# Patient Record
Sex: MALE | Race: BLACK OR AFRICAN AMERICAN | NOT HISPANIC OR LATINO | URBAN - METROPOLITAN AREA
[De-identification: names, ages, dates, MRNs, and addresses within clinical notes are randomized per-mention and may not be internally consistent; named-entity substitution may affect disease eponyms.]

---

## 2018-01-06 ENCOUNTER — INPATIENT (INPATIENT)
Facility: HOSPITAL | Age: 58
LOS: 1 days | Discharge: HOME | End: 2018-01-08
Attending: HOSPITALIST | Admitting: HOSPITALIST
Payer: COMMERCIAL

## 2018-01-06 VITALS
TEMPERATURE: 100 F | OXYGEN SATURATION: 96 % | HEART RATE: 122 BPM | SYSTOLIC BLOOD PRESSURE: 158 MMHG | RESPIRATION RATE: 24 BRPM | DIASTOLIC BLOOD PRESSURE: 101 MMHG

## 2018-01-06 DIAGNOSIS — Z29.9 ENCOUNTER FOR PROPHYLACTIC MEASURES, UNSPECIFIED: ICD-10-CM

## 2018-01-06 DIAGNOSIS — A41.9 SEPSIS, UNSPECIFIED ORGANISM: ICD-10-CM

## 2018-01-06 DIAGNOSIS — J11.1 INFLUENZA DUE TO UNIDENTIFIED INFLUENZA VIRUS WITH OTHER RESPIRATORY MANIFESTATIONS: ICD-10-CM

## 2018-01-06 DIAGNOSIS — J45.901 UNSPECIFIED ASTHMA WITH (ACUTE) EXACERBATION: ICD-10-CM

## 2018-01-06 DIAGNOSIS — J45.909 UNSPECIFIED ASTHMA, UNCOMPLICATED: ICD-10-CM

## 2018-01-06 LAB
ALBUMIN SERPL ELPH-MCNC: 4.9 G/DL — SIGNIFICANT CHANGE UP (ref 3.3–5)
ALP SERPL-CCNC: 69 U/L — SIGNIFICANT CHANGE UP (ref 40–120)
ALT FLD-CCNC: 20 U/L — SIGNIFICANT CHANGE UP (ref 4–41)
APTT BLD: 31.7 SEC — SIGNIFICANT CHANGE UP (ref 27.5–37.4)
AST SERPL-CCNC: 45 U/L — HIGH (ref 4–40)
B PERT DNA SPEC QL NAA+PROBE: SIGNIFICANT CHANGE UP
BASE EXCESS BLDV CALC-SCNC: 3.1 MMOL/L — SIGNIFICANT CHANGE UP
BASOPHILS # BLD AUTO: 0.05 K/UL — SIGNIFICANT CHANGE UP (ref 0–0.2)
BASOPHILS NFR BLD AUTO: 0.7 % — SIGNIFICANT CHANGE UP (ref 0–2)
BILIRUB SERPL-MCNC: 0.7 MG/DL — SIGNIFICANT CHANGE UP (ref 0.2–1.2)
BLOOD GAS VENOUS - CREATININE: SIGNIFICANT CHANGE UP MG/DL (ref 0.5–1.3)
BUN SERPL-MCNC: 17 MG/DL — SIGNIFICANT CHANGE UP (ref 7–23)
C PNEUM DNA SPEC QL NAA+PROBE: NOT DETECTED — SIGNIFICANT CHANGE UP
CALCIUM SERPL-MCNC: 9.6 MG/DL — SIGNIFICANT CHANGE UP (ref 8.4–10.5)
CHLORIDE BLDV-SCNC: 103 MMOL/L — SIGNIFICANT CHANGE UP (ref 96–108)
CHLORIDE SERPL-SCNC: 98 MMOL/L — SIGNIFICANT CHANGE UP (ref 98–107)
CK MB BLD-MCNC: 0.5 — SIGNIFICANT CHANGE UP (ref 0–2.5)
CK MB BLD-MCNC: 5.2 NG/ML — SIGNIFICANT CHANGE UP (ref 1–6.6)
CK SERPL-CCNC: 1048 U/L — HIGH (ref 30–200)
CO2 SERPL-SCNC: 25 MMOL/L — SIGNIFICANT CHANGE UP (ref 22–31)
CREAT SERPL-MCNC: 1.28 MG/DL — SIGNIFICANT CHANGE UP (ref 0.5–1.3)
EOSINOPHIL # BLD AUTO: 0 K/UL — SIGNIFICANT CHANGE UP (ref 0–0.5)
EOSINOPHIL NFR BLD AUTO: 0 % — SIGNIFICANT CHANGE UP (ref 0–6)
FLUAV H1 2009 PAND RNA SPEC QL NAA+PROBE: POSITIVE — HIGH
FLUAV H1 RNA SPEC QL NAA+PROBE: NOT DETECTED — SIGNIFICANT CHANGE UP
FLUAV H3 RNA SPEC QL NAA+PROBE: NOT DETECTED — SIGNIFICANT CHANGE UP
FLUBV RNA SPEC QL NAA+PROBE: NOT DETECTED — SIGNIFICANT CHANGE UP
GAS PNL BLDV: 138 MMOL/L — SIGNIFICANT CHANGE UP (ref 136–146)
GLUCOSE BLDV-MCNC: 110 — HIGH (ref 70–99)
GLUCOSE SERPL-MCNC: 106 MG/DL — HIGH (ref 70–99)
HADV DNA SPEC QL NAA+PROBE: NOT DETECTED — SIGNIFICANT CHANGE UP
HCO3 BLDV-SCNC: 25 MMOL/L — SIGNIFICANT CHANGE UP (ref 20–27)
HCOV 229E RNA SPEC QL NAA+PROBE: NOT DETECTED — SIGNIFICANT CHANGE UP
HCOV HKU1 RNA SPEC QL NAA+PROBE: NOT DETECTED — SIGNIFICANT CHANGE UP
HCOV NL63 RNA SPEC QL NAA+PROBE: NOT DETECTED — SIGNIFICANT CHANGE UP
HCOV OC43 RNA SPEC QL NAA+PROBE: NOT DETECTED — SIGNIFICANT CHANGE UP
HCT VFR BLD CALC: 45.8 % — SIGNIFICANT CHANGE UP (ref 39–50)
HCT VFR BLDV CALC: 47.3 % — SIGNIFICANT CHANGE UP (ref 39–51)
HGB BLD-MCNC: 15.4 G/DL — SIGNIFICANT CHANGE UP (ref 13–17)
HGB BLDV-MCNC: 15.4 G/DL — SIGNIFICANT CHANGE UP (ref 13–17)
HMPV RNA SPEC QL NAA+PROBE: NOT DETECTED — SIGNIFICANT CHANGE UP
HPIV1 RNA SPEC QL NAA+PROBE: NOT DETECTED — SIGNIFICANT CHANGE UP
HPIV2 RNA SPEC QL NAA+PROBE: NOT DETECTED — SIGNIFICANT CHANGE UP
HPIV3 RNA SPEC QL NAA+PROBE: NOT DETECTED — SIGNIFICANT CHANGE UP
HPIV4 RNA SPEC QL NAA+PROBE: NOT DETECTED — SIGNIFICANT CHANGE UP
IMM GRANULOCYTES # BLD AUTO: 0.02 # — SIGNIFICANT CHANGE UP
IMM GRANULOCYTES NFR BLD AUTO: 0.3 % — SIGNIFICANT CHANGE UP (ref 0–1.5)
INR BLD: 1.18 — HIGH (ref 0.88–1.17)
LACTATE BLDV-MCNC: 1.7 MMOL/L — SIGNIFICANT CHANGE UP (ref 0.5–2)
LYMPHOCYTES # BLD AUTO: 0.79 K/UL — LOW (ref 1–3.3)
LYMPHOCYTES # BLD AUTO: 11.6 % — LOW (ref 13–44)
M PNEUMO DNA SPEC QL NAA+PROBE: NOT DETECTED — SIGNIFICANT CHANGE UP
MCHC RBC-ENTMCNC: 30.8 PG — SIGNIFICANT CHANGE UP (ref 27–34)
MCHC RBC-ENTMCNC: 33.6 % — SIGNIFICANT CHANGE UP (ref 32–36)
MCV RBC AUTO: 91.6 FL — SIGNIFICANT CHANGE UP (ref 80–100)
MONOCYTES # BLD AUTO: 0.74 K/UL — SIGNIFICANT CHANGE UP (ref 0–0.9)
MONOCYTES NFR BLD AUTO: 10.9 % — SIGNIFICANT CHANGE UP (ref 2–14)
NEUTROPHILS # BLD AUTO: 5.19 K/UL — SIGNIFICANT CHANGE UP (ref 1.8–7.4)
NEUTROPHILS NFR BLD AUTO: 76.5 % — SIGNIFICANT CHANGE UP (ref 43–77)
NRBC # FLD: 0 — SIGNIFICANT CHANGE UP
PCO2 BLDV: 49 MMHG — SIGNIFICANT CHANGE UP (ref 41–51)
PH BLDV: 7.37 PH — SIGNIFICANT CHANGE UP (ref 7.32–7.43)
PLATELET # BLD AUTO: 211 K/UL — SIGNIFICANT CHANGE UP (ref 150–400)
PMV BLD: 9.8 FL — SIGNIFICANT CHANGE UP (ref 7–13)
PO2 BLDV: 30 MMHG — LOW (ref 35–40)
POTASSIUM BLDV-SCNC: 3.9 MMOL/L — SIGNIFICANT CHANGE UP (ref 3.4–4.5)
POTASSIUM SERPL-MCNC: 3.9 MMOL/L — SIGNIFICANT CHANGE UP (ref 3.5–5.3)
POTASSIUM SERPL-SCNC: 3.9 MMOL/L — SIGNIFICANT CHANGE UP (ref 3.5–5.3)
PROT SERPL-MCNC: 8 G/DL — SIGNIFICANT CHANGE UP (ref 6–8.3)
PROTHROM AB SERPL-ACNC: 13.1 SEC — SIGNIFICANT CHANGE UP (ref 9.8–13.1)
RBC # BLD: 5 M/UL — SIGNIFICANT CHANGE UP (ref 4.2–5.8)
RBC # FLD: 13.7 % — SIGNIFICANT CHANGE UP (ref 10.3–14.5)
RSV RNA SPEC QL NAA+PROBE: NOT DETECTED — SIGNIFICANT CHANGE UP
RV+EV RNA SPEC QL NAA+PROBE: NOT DETECTED — SIGNIFICANT CHANGE UP
SAO2 % BLDV: 51.7 % — LOW (ref 60–85)
SODIUM SERPL-SCNC: 140 MMOL/L — SIGNIFICANT CHANGE UP (ref 135–145)
TROPONIN T SERPL-MCNC: < 0.06 NG/ML — SIGNIFICANT CHANGE UP (ref 0–0.06)
WBC # BLD: 6.79 K/UL — SIGNIFICANT CHANGE UP (ref 3.8–10.5)
WBC # FLD AUTO: 6.79 K/UL — SIGNIFICANT CHANGE UP (ref 3.8–10.5)

## 2018-01-06 PROCEDURE — 71046 X-RAY EXAM CHEST 2 VIEWS: CPT | Mod: 26

## 2018-01-06 PROCEDURE — 99223 1ST HOSP IP/OBS HIGH 75: CPT | Mod: GC

## 2018-01-06 RX ORDER — HEPARIN SODIUM 5000 [USP'U]/ML
5000 INJECTION INTRAVENOUS; SUBCUTANEOUS EVERY 8 HOURS
Refills: 0 | Status: DISCONTINUED | OUTPATIENT
Start: 2018-01-06 | End: 2018-01-08

## 2018-01-06 RX ORDER — BUDESONIDE AND FORMOTEROL FUMARATE DIHYDRATE 160; 4.5 UG/1; UG/1
2 AEROSOL RESPIRATORY (INHALATION) ONCE
Refills: 0 | Status: COMPLETED | OUTPATIENT
Start: 2018-01-06 | End: 2018-01-06

## 2018-01-06 RX ORDER — BUDESONIDE AND FORMOTEROL FUMARATE DIHYDRATE 160; 4.5 UG/1; UG/1
2 AEROSOL RESPIRATORY (INHALATION)
Refills: 0 | Status: DISCONTINUED | OUTPATIENT
Start: 2018-01-06 | End: 2018-01-08

## 2018-01-06 RX ORDER — SODIUM CHLORIDE 9 MG/ML
1000 INJECTION INTRAMUSCULAR; INTRAVENOUS; SUBCUTANEOUS
Refills: 0 | Status: DISCONTINUED | OUTPATIENT
Start: 2018-01-06 | End: 2018-01-07

## 2018-01-06 RX ORDER — ALBUTEROL 90 UG/1
2 AEROSOL, METERED ORAL EVERY 6 HOURS
Refills: 0 | Status: DISCONTINUED | OUTPATIENT
Start: 2018-01-06 | End: 2018-01-08

## 2018-01-06 RX ORDER — SODIUM CHLORIDE 9 MG/ML
1000 INJECTION INTRAMUSCULAR; INTRAVENOUS; SUBCUTANEOUS ONCE
Refills: 0 | Status: COMPLETED | OUTPATIENT
Start: 2018-01-06 | End: 2018-01-06

## 2018-01-06 RX ORDER — IPRATROPIUM/ALBUTEROL SULFATE 18-103MCG
3 AEROSOL WITH ADAPTER (GRAM) INHALATION ONCE
Refills: 0 | Status: COMPLETED | OUTPATIENT
Start: 2018-01-06 | End: 2018-01-06

## 2018-01-06 RX ORDER — ACETAMINOPHEN 500 MG
975 TABLET ORAL ONCE
Refills: 0 | Status: COMPLETED | OUTPATIENT
Start: 2018-01-06 | End: 2018-01-06

## 2018-01-06 RX ORDER — MAGNESIUM SULFATE 500 MG/ML
2 VIAL (ML) INJECTION ONCE
Refills: 0 | Status: COMPLETED | OUTPATIENT
Start: 2018-01-06 | End: 2018-01-06

## 2018-01-06 RX ADMIN — Medication 50 MILLIGRAM(S): at 02:52

## 2018-01-06 RX ADMIN — Medication 3 MILLILITER(S): at 02:52

## 2018-01-06 RX ADMIN — Medication 3 MILLILITER(S): at 02:40

## 2018-01-06 RX ADMIN — SODIUM CHLORIDE 100 MILLILITER(S): 9 INJECTION INTRAMUSCULAR; INTRAVENOUS; SUBCUTANEOUS at 13:11

## 2018-01-06 RX ADMIN — ALBUTEROL 2 PUFF(S): 90 AEROSOL, METERED ORAL at 13:07

## 2018-01-06 RX ADMIN — BUDESONIDE AND FORMOTEROL FUMARATE DIHYDRATE 2 PUFF(S): 160; 4.5 AEROSOL RESPIRATORY (INHALATION) at 18:10

## 2018-01-06 RX ADMIN — Medication 75 MILLIGRAM(S): at 05:40

## 2018-01-06 RX ADMIN — Medication 50 GRAM(S): at 05:40

## 2018-01-06 RX ADMIN — SODIUM CHLORIDE 1000 MILLILITER(S): 9 INJECTION INTRAMUSCULAR; INTRAVENOUS; SUBCUTANEOUS at 02:52

## 2018-01-06 RX ADMIN — Medication 75 MILLIGRAM(S): at 19:38

## 2018-01-06 RX ADMIN — Medication 100 MILLIGRAM(S): at 19:01

## 2018-01-06 RX ADMIN — Medication 975 MILLIGRAM(S): at 03:04

## 2018-01-06 NOTE — DISCHARGE NOTE ADULT - CARE PLAN
Principal Discharge DX:	Influenza  Secondary Diagnosis:	Asthma exacerbation Principal Discharge DX:	Influenza  Goal:	Resolution of symptoms  Instructions for follow-up, activity and diet:	You were diagnosed with influenza while you were in the hospital.  To help the infection and your breathing, you were prescribed Tamiflu and a course of oral steroids.  Please take these medications exactly as prescribed for the full duration.  Follow-up with your primary care physician if you have any further needs.  Secondary Diagnosis:	Asthma exacerbation  Instructions for follow-up, activity and diet:	You had an exacerbation of your asthma, which was caused by the cold weather and the influenza.  You were given steroids to help the inflammation.  Please take this medication exactly as prescribed.  Follow-up with your pulmonologist or primary care physician if you have any further needs. Principal Discharge DX:	Influenza  Goal:	Resolution of symptoms  Instructions for follow-up, activity and diet:	You were diagnosed with influenza while you were in the hospital.  To help the infection and your breathing, you were prescribed Tamiflu and a course of oral steroids.  Please take tamiflu as prescribed. Your last day should be on Jan 10th. Your CK levels in your blood was also elevated likely due to influenza. Please follow-up with your primary care physician 1 week after discharge to recheck CK levels in your blood.  Secondary Diagnosis:	Asthma exacerbation  Instructions for follow-up, activity and diet:	You had an exacerbation of your asthma, which was caused by the cold weather and the influenza.  You were given steroids to help the inflammation.  Please take this prednisone exactly as prescribed. Last day on Jan 10th. Follow-up with your pulmonologist or primary care physician if you have any further needs. Principal Discharge DX:	Influenza  Goal:	Resolution of symptoms  Assessment and plan of treatment:	You were diagnosed with influenza while you were in the hospital.  To help the infection and your breathing, you were prescribed Tamiflu and a course of oral steroids.  Please take tamiflu as prescribed. Your last day should be on Jan 10th. Your CK levels in your blood was also elevated likely due to influenza. Please follow-up with your primary care physician 1 week after discharge to recheck CK levels in your blood.  Secondary Diagnosis:	Asthma exacerbation  Assessment and plan of treatment:	You had an exacerbation of your asthma, which was caused by the cold weather and the influenza.  You were given steroids to help the inflammation.  Please take this prednisone exactly as prescribed. Last day on Jan 10th. Follow-up with your pulmonologist or primary care physician if you have any further needs.  Secondary Diagnosis:	Non-traumatic rhabdomyolysis  Assessment and plan of treatment:	you have an elevated Creatinine Kinase due to a viral myositis, improve with IV fluid, Please continue oral hydration.

## 2018-01-06 NOTE — H&P ADULT - NSHPREVIEWOFSYSTEMS_GEN_ALL_CORE
Review of Systems:   CONSTITUTIONAL: Fever.   EYES: No eye pain, visual disturbances, or discharge  ENMT:  No difficulty hearing, tinnitus, vertigo; No sinus or throat pain  NECK: No pain or stiffness  BREASTS: No pain, masses, or nipple discharge  RESPIRATORY: Cough, wheezing, SOB, chills. No hemoptysis.  CARDIOVASCULAR: No chest pain, palpitations, dizziness, or leg swelling  GASTROINTESTINAL: No abdominal or epigastric pain. No nausea, vomiting, or hematemesis; No diarrhea or constipation. No melena or hematochezia.  GENITOURINARY: No dysuria, frequency, hematuria, or incontinence  NEUROLOGICAL: No headaches, memory loss, loss of strength, numbness, or tremors  SKIN: No itching, burning, rashes, or lesions   LYMPH NODES: No enlarged glands  ENDOCRINE: No heat or cold intolerance; No hair loss  MUSCULOSKELETAL: No joint pain or swelling; No muscle, back, or extremity pain  PSYCHIATRIC: No depression, anxiety, mood swings, or difficulty sleeping  HEME/LYMPH: No easy bruising, or bleeding gums  ALLERY AND IMMUNOLOGIC: No hives or eczema

## 2018-01-06 NOTE — H&P ADULT - NSHPPHYSICALEXAM_GEN_ALL_CORE
Constitutional: Well-appearing, NAD, resting comfortably in bed  Eyes: EOMI, PERRLA  ENMT: MMM, dentition intact  Neck: ROM intact  Respiratory: Decreased air sounds bilaterally with minimal wheezing  Cardiovascular:  Tachycardic, no appreciated murmur  Gastrointestinal: +BS, NT/D  Extremities: No peripheral edema noted  Neurological: No focal deficits noted  Skin: Intact to gross observation  Psychiatric: A&Ox3, euthymic

## 2018-01-06 NOTE — DISCHARGE NOTE ADULT - MEDICATION SUMMARY - MEDICATIONS TO TAKE
Yes I will START or STAY ON the medications listed below when I get home from the hospital:    predniSONE 20 mg oral tablet  -- 2 tab(s) by mouth once a day  -- Indication: For Asthma exacerbation    oseltamivir 75 mg oral capsule  -- 1 cap(s) by mouth 2 times a day  -- Indication: For Influenza    Symbicort 80 mcg-4.5 mcg/inh inhalation aerosol  -- 2 puff(s) inhaled 2 times a day  -- Indication: For Asthma    albuterol 90 mcg/inh inhalation aerosol  -- 2 puff(s) inhaled 4 times a day  -- Indication: For Asthma

## 2018-01-06 NOTE — ED PROVIDER NOTE - ATTENDING CONTRIBUTION TO CARE
58 y/o M with h/o asthma here with 2 days of difficulty breathing, cough productive of green sputum.  Pt also reports subjective chills.  No recent travel or known sick contacts.  No recent illness/hospitalizations.  No fever, cp, back pain, abd pain, n/v/d, leg pain or swelling.  No flu shot this year.  Pt is sitting comfortably in stretcher, awake and alert, nontoxic.  Febrile rectally, tachycardic, tachypneic.  No increased work of breathing, tachypneic, slightly breathless of speech and coughing but speaking in full sentences.  Lungs with decreased air entry throughout all lung fields.  Cards nl S1/S2, tachy regular, no MRG.  Abd soft ntnd.  No pedal edema or calf tenderness.  Likely asthma exacerbation 2/2 viral uri, r/o pna.  Plan for labs, cxr, rvp, nebs, steroids, antipyretics, reassess.  Will hold abx pending rvp without focal resp findings suggestive of pna and suspicion for viral etiology of sxs.

## 2018-01-06 NOTE — DISCHARGE NOTE ADULT - CONDITIONS AT DISCHARGE
Patient a&ox4, VSS, no acute distress noted. Skin intact. No c/o of pain at this time. To be d/c home

## 2018-01-06 NOTE — H&P ADULT - ASSESSMENT
57 yoM with MHx of asthma p/w SOB that is noticeable most while coughing, tachycardia, and elevated CK, likely related to + RVP for influenza.  No acute distress at this time.

## 2018-01-06 NOTE — H&P ADULT - NSHPLABSRESULTS_GEN_ALL_CORE
EXAM: XR CHEST PA LAT 2V       INTERPRETATION: CLINICAL INDICATION: Shortness of breath     TECHNIQUE: Two views of the chest dated 1/6/2018     COMPARISON: None     FINDINGS:   Lungs are clear bilaterally. No pleural effusion or pneumothorax noted.   Cardiac silhouette is unremarkable. Thoracic spondylosis is noted.     IMPRESSION:   Clear lungs.         CK elevated.  Other labs reviewed. LABS:                        15.4   6.79  )-----------( 211      ( 06 Jan 2018 02:42 )             45.8     01-06    140  |  98  |  17  ----------------------------<  106<H>  3.9   |  25  |  1.28    Ca    9.6      06 Jan 2018 02:42    TPro  8.0  /  Alb  4.9  /  TBili  0.7  /  DBili  x   /  AST  45<H>  /  ALT  20  /  AlkPhos  69  01-06    PT/INR - ( 06 Jan 2018 02:42 )   PT: 13.1 SEC;   INR: 1.18          PTT - ( 06 Jan 2018 02:42 )  PTT:31.7 SEC  CARDIAC MARKERS ( 06 Jan 2018 02:42 )  x     / < 0.06 ng/mL / 1048 u/L / 5.20 ng/mL / x            RADIOLOGY & ADDITIONAL TESTS:    Imaging Personally Reviewed:    Consultant(s) Notes Reviewed:      Care Discussed with Consultants/Other Providers:    EXAM: XR CHEST PA LAT 2V       INTERPRETATION: CLINICAL INDICATION: Shortness of breath     TECHNIQUE: Two views of the chest dated 1/6/2018     COMPARISON: None     FINDINGS:   Lungs are clear bilaterally. No pleural effusion or pneumothorax noted.   Cardiac silhouette is unremarkable. Thoracic spondylosis is noted.     IMPRESSION:   Clear lungs.

## 2018-01-06 NOTE — DISCHARGE NOTE ADULT - PATIENT PORTAL LINK FT
“You can access the [facility] Patient Portal, offered by [facility], by registering with the following website: [email or web address]”

## 2018-01-06 NOTE — ED ADULT NURSE NOTE - CHIEF COMPLAINT
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 Also c/o chills, green productive cough xcouple days.

## 2018-01-06 NOTE — H&P ADULT - HISTORY OF PRESENT ILLNESS
Patient is a prior healthy 57 yoM with MHx of asthma only (on Symbicort and albuterol at home, no hospitalizations/intubations) who developed an URI approximately two weeks ago with cough and loss of phonation.  Initially he improved well, but two days ago noticed that he was significantly SOB, particularly when he had deep coughs.  This AM he also was SOB and so decided to come to the hospital.  The SOB is only noticeable when he coughs deeply.    In the ED he was noted to have a fever (rectal) of 101.  He was admitted for r/o of PNA in setting of asthma.  RVP returned positive for influenza.  CXR was clear for any focal pneumonia.    At time of interview patient is breathing comfortably.  His coughing has noticeably decreased since yesterday.  He is breathing comfortably. Patient is a prior healthy 57 yoM with MHx of asthma only (on Symbicort and albuterol at home, no hospitalizations/intubations) who developed an URI approximately two weeks ago with cough and loss of phonation.  Initially he improved well, but two days ago noticed that he was significantly SOB, particularly when he had deep coughs.  This AM he also was SOB and so decided to come to the hospital.  The SOB is only noticeable when he coughs deeply.    In the ED he was noted to have a fever (rectal) of 101.  He was admitted for r/o of PNA in setting of asthma.  RVP returned positive for influenza.  CXR was clear for any focal pneumonia.    At time of interview patient is breathing comfortably.  His coughing has noticeably decreased since yesterday.  He is breathing comfortably.  He does note that he feels like he pulled his right shoulder a few days ago. HPI:  Patient is a prior healthy 57 yoM with MHx of asthma only (on Symbicort and albuterol at home, no hospitalizations/intubations) who developed an URI approximately two weeks ago with cough and loss of phonation.  Initially he improved well, but two days ago noticed that he was significantly SOB, particularly when he had deep coughs.  This AM he also was SOB and so decided to come to the hospital.  The SOB is only noticeable when he coughs deeply.    In the ED he was noted to have a fever (rectal) of 101.  He was admitted for r/o of PNA in setting of asthma.  RVP returned positive for influenza.  CXR was clear for any focal pneumonia.    At time of interview patient is breathing comfortably.  His coughing has noticeably decreased since yesterday.  He is breathing comfortably.  He does note that he feels like he pulled his right shoulder a few days ago. (06 Jan 2018 10:39)      MEDICATIONS  (STANDING):  buDESOnide  80 MICROgram(s)/formoterol 4.5 MICROgram(s) Inhaler 2 Puff(s) Inhalation two times a day  heparin  Injectable 5000 Unit(s) SubCutaneous every 8 hours  oseltamivir 75 milliGRAM(s) Oral two times a day  sodium chloride 0.9%. 1000 milliLiter(s) (100 mL/Hr) IV Continuous <Continuous>    MEDICATIONS  (PRN):  ALBUTerol    90 MICROgram(s) HFA Inhaler 2 Puff(s) Inhalation every 6 hours PRN Wheezing  guaiFENesin    Syrup 100 milliGRAM(s) Oral every 6 hours PRN Cough      Vital Signs Last 24 Hrs  T(C): 36.8 (06 Jan 2018 15:54), Max: 38.7 (06 Jan 2018 02:53)  T(F): 98.3 (06 Jan 2018 15:54), Max: 101.7 (06 Jan 2018 02:53)  HR: 102 (06 Jan 2018 15:54) (102 - 122)  BP: 136/85 (06 Jan 2018 15:54) (136/85 - 170/98)  BP(mean): --  RR: 16 (06 Jan 2018 15:54) (16 - 26)  SpO2: 97% (06 Jan 2018 15:54) (95% - 98%)  CAPILLARY BLOOD GLUCOSE

## 2018-01-06 NOTE — H&P ADULT - PROBLEM SELECTOR PLAN 2
- Patient on symbicort and albuterol at home which he only takes occasionally  - Continue symbicort/albuterol in patient  - No need for increasing therapy at this point - Patient on symbicort and albuterol at home which he only takes occasionally  - Continue symbicort/albuterol in patient  - Pt still SOB, with mild wheezing on LLL, likely reactive airway, will treat as asthma exacerbation with oral prednisone 40 mg daily for 5 days.

## 2018-01-06 NOTE — ED PROVIDER NOTE - MEDICAL DECISION MAKING DETAILS
58 y/o M with PMH asthma p/w sob and fever.  likely URi w/ asthma exacerbation. cbc, cmp, cxr, rvp, duonebs, prednisone, rvp. will get Ekg and trop for chest pain though low risk.

## 2018-01-06 NOTE — DISCHARGE NOTE ADULT - HOSPITAL COURSE
Patient is a prior healthy 57 yoM with a MHx of asthma only who p/w SOB and cough, and was found to have influenza leading to an asthma exacerbation.  Patient was started on steroids and Tamiflu for his symptoms.  He tolerated the medications well with good resolution of his symptoms.  On laboratory testing he was found to have an elevated Creatinine Kinase due to a viral myositis.  He was given 1L of IVF rehydration to help resolve the CK.  On repeat testing he had good resolution of the CK.  He had HIV and HepC screening tests done for which he will follow-up with his outpatient provider.  He was medically cleared to discharge home and was advised to take his medications exactly as prescribed. Patient is a prior healthy 57 yoM with a MHx of asthma only who p/w SOB and cough, and was found to have influenza leading to an asthma exacerbation.  Patient was started on steroids and Tamiflu for his symptoms.  He tolerated the medications well with good resolution of his symptoms.  On laboratory testing he was found to have an elevated Creatinine Kinase due to a viral myositis.  He was given 1L of IVF rehydration to help resolve the CK.  On repeat testing he had good resolution of the CK.  He had HIV (non-reactive) and HepC screening tests done for which he will follow-up with his outpatient provider.  He was medically cleared to discharge home and was advised to take his medications exactly as prescribed.

## 2018-01-06 NOTE — ED ADULT NURSE NOTE - OBJECTIVE STATEMENT
Received pt in room 22, ambulatory, pt A&Ox4, respirations even and slightly labored b/l, x1 duoneb given in triage. Abdomen soft, nondistended, nontender. IVL 20g Angiocath placed on left AC. Labs sent. MD Lai at bedside. Will continue to monitor.

## 2018-01-06 NOTE — H&P ADULT - PROBLEM SELECTOR PLAN 4
Pt had fever 101, and tachycardia on admission, met sepsis criteria, due to influenza. Empirically treated with influenza  - f/u Bx  - CXR clear lungs

## 2018-01-06 NOTE — ED PROVIDER NOTE - OBJECTIVE STATEMENT
58 y/o M with PMH asthma p/w sob and fever. Patient states he woke up this am w/ increased trouble breathing and wheezing. He states he had sinus congestion and cough. Denies recent sick contacts. States his SOB has increased and has not improved with his albuterol and symbicort. He states w/ cough he has mild left sided chest pain. he denies productive sputum. States he received 1 treatment prior to exam

## 2018-01-06 NOTE — DISCHARGE NOTE ADULT - PLAN OF CARE
Resolution of symptoms You were diagnosed with influenza while you were in the hospital.  To help the infection and your breathing, you were prescribed Tamiflu and a course of oral steroids.  Please take these medications exactly as prescribed for the full duration.  Follow-up with your primary care physician if you have any further needs. You had an exacerbation of your asthma, which was caused by the cold weather and the influenza.  You were given steroids to help the inflammation.  Please take this medication exactly as prescribed.  Follow-up with your pulmonologist or primary care physician if you have any further needs. You were diagnosed with influenza while you were in the hospital.  To help the infection and your breathing, you were prescribed Tamiflu and a course of oral steroids.  Please take tamiflu as prescribed. Your last day should be on Jan 10th. Your CK levels in your blood was also elevated likely due to influenza. Please follow-up with your primary care physician 1 week after discharge to recheck CK levels in your blood. You had an exacerbation of your asthma, which was caused by the cold weather and the influenza.  You were given steroids to help the inflammation.  Please take this prednisone exactly as prescribed. Last day on Jan 10th. Follow-up with your pulmonologist or primary care physician if you have any further needs. you have an elevated Creatinine Kinase due to a viral myositis, improve with IV fluid, Please continue oral hydration.

## 2018-01-06 NOTE — H&P ADULT - ATTENDING COMMENTS
Pt was seen and examed at bedside with resident.  56 yo M PMH asthma p/w SOB that is noticeable most while coughing, tachycardia, and elevated CK, likely related to + RVP for influenza. Pt had fever 101, and tachycardia on admission, met sepsis criteria, due to influenza. Consider pt symptoms, empirically treated with influenza. f/u Bx. CXR clear lungs. Pt has mild wheezing worse on LLL on examination, likely reactive airway, also will give 5 days oral prednisone. continue Symbicort and albuterol.   Pt was found to have elevated CPK possible viral myositis 2/2 influenza, IVF, trending CK levels.

## 2018-01-06 NOTE — DISCHARGE NOTE ADULT - PROVIDER TOKENS
FREE:[LAST:[Bita],FIRST:[Saad],PHONE:[phone]:[phone]    -],ADDRESS:[[address] [place] [id number]]]

## 2018-01-06 NOTE — H&P ADULT - PROBLEM SELECTOR PLAN 1
- Outside window for Tamiflu  - No signs of toxicity  - CK increase likely related to viral myositis 2/2 influenza - although outside window for the best effects of Tamiflu, considering Pt symptoms,  will empirically   - CK increase likely related to viral myositis 2/2 influenza

## 2018-01-06 NOTE — ED ADULT TRIAGE NOTE - CHIEF COMPLAINT QUOTE
pt is wheezing since Yesterday AM. PMH of Asthma and is on symbicort and ventolin which is not working. Has cold symptoms.

## 2018-01-07 LAB
CK SERPL-CCNC: 1711 U/L — HIGH (ref 30–200)
HIV 1+2 AB+HIV1 P24 AG SERPL QL IA: SIGNIFICANT CHANGE UP
SPECIMEN SOURCE: SIGNIFICANT CHANGE UP
SPECIMEN SOURCE: SIGNIFICANT CHANGE UP

## 2018-01-07 PROCEDURE — 99233 SBSQ HOSP IP/OBS HIGH 50: CPT | Mod: GC

## 2018-01-07 RX ORDER — SODIUM CHLORIDE 9 MG/ML
1000 INJECTION INTRAMUSCULAR; INTRAVENOUS; SUBCUTANEOUS
Refills: 0 | Status: DISCONTINUED | OUTPATIENT
Start: 2018-01-07 | End: 2018-01-08

## 2018-01-07 RX ADMIN — SODIUM CHLORIDE 150 MILLILITER(S): 9 INJECTION INTRAMUSCULAR; INTRAVENOUS; SUBCUTANEOUS at 17:13

## 2018-01-07 RX ADMIN — BUDESONIDE AND FORMOTEROL FUMARATE DIHYDRATE 2 PUFF(S): 160; 4.5 AEROSOL RESPIRATORY (INHALATION) at 08:46

## 2018-01-07 RX ADMIN — Medication 40 MILLIGRAM(S): at 05:28

## 2018-01-07 RX ADMIN — BUDESONIDE AND FORMOTEROL FUMARATE DIHYDRATE 2 PUFF(S): 160; 4.5 AEROSOL RESPIRATORY (INHALATION) at 22:10

## 2018-01-07 RX ADMIN — Medication 75 MILLIGRAM(S): at 17:13

## 2018-01-07 RX ADMIN — Medication 100 MILLIGRAM(S): at 17:13

## 2018-01-07 RX ADMIN — Medication 75 MILLIGRAM(S): at 05:28

## 2018-01-07 RX ADMIN — SODIUM CHLORIDE 150 MILLILITER(S): 9 INJECTION INTRAMUSCULAR; INTRAVENOUS; SUBCUTANEOUS at 08:46

## 2018-01-07 NOTE — PROGRESS NOTE ADULT - ASSESSMENT
56 yo M with PMHx of asthma p/w SOB, tachycardia, and elevated CK likely related to +RVP for influenza A.

## 2018-01-07 NOTE — PROGRESS NOTE ADULT - SUBJECTIVE AND OBJECTIVE BOX
Sapna Lira  PGY-2 Internal Medicine  [facility]: [id number]  NS: [phone]    Patient is a 57y old  Male who presents with a chief complaint of SOB (06 Jan 2018 14:50)      INTERVAL HPI/OVERNIGHT EVENTS:    MEDICATIONS (STANDING):  buDESOnide  80 MICROgram(s)/formoterol 4.5 MICROgram(s) Inhaler 2 Puff(s) Inhalation two times a day  heparin  Injectable 5000 Unit(s) SubCutaneous every 8 hours  oseltamivir 75 milliGRAM(s) Oral two times a day  predniSONE   Tablet 40 milliGRAM(s) Oral daily  sodium chloride 0.9%. 1000 milliLiter(s) IV Continuous <Continuous>    MEDICATIONS  (PRN):  ALBUTerol    90 MICROgram(s) HFA Inhaler 2 Puff(s) Inhalation every 6 hours PRN  guaiFENesin    Syrup 100 milliGRAM(s) Oral every 6 hours PRN      REVIEW OF SYSTEMS:  CONSTITUTIONAL: No fever, weight loss, or fatigue  EYES: No eye pain, visual disturbances, or discharge  ENMT:  No difficulty hearing, tinnitus, vertigo; No sinus or throat pain  NECK: No pain or stiffness  RESPIRATORY: No cough, wheezing, chills or hemoptysis; No shortness of breath  CARDIOVASCULAR: No chest pain, palpitations, dizziness, or leg swelling  GASTROINTESTINAL: No abdominal or epigastric pain. No nausea, vomiting, or hematemesis; No diarrhea or constipation. No melena or hematochezia.  GENITOURINARY: No dysuria, frequency, hematuria, or incontinence  NEUROLOGICAL: No headaches, memory loss, loss of strength, numbness, or tremors  SKIN: No itching, burning, rashes, or lesions   MUSCULOSKELETAL: No joint pain or swelling; No muscle, back, or extremity pain    T(F): 98.7 (01-07-18 @ 05:24), Max: 98.7 (01-07-18 @ 05:24)  HR: 93 (01-07-18 @ 05:24) (93 - 102)  BP: 146/101 (01-07-18 @ 05:24) (136/85 - 146/101)  RR: 18 (01-07-18 @ 05:24) (16 - 18)  SpO2: 100% (01-07-18 @ 05:24) (97% - 100%)  Wt(kg): --  CAPILLARY BLOOD GLUCOSE        I&O's Summary      PHYSICAL EXAM:  GENERAL: NAD, well-groomed, well-developed  HEAD:  Atraumatic, Normocephalic  EYES: EOMI, PERRLA, conjunctiva and sclera clear  ENMT: No tonsillar erythema, exudates, or enlargement; Moist mucous membranes  NECK: Supple, No JVD, Normal thyroid  NERVOUS SYSTEM:  Alert & Oriented X3, Good concentration; Motor Strength 5/5 B/L upper and lower extremities  CHEST/LUNG: Clear to percussion bilaterally; No rales, rhonchi, wheezing, or rubs  HEART: Regular rate and rhythm; No murmurs, rubs, or gallops  ABDOMEN: Soft, Nontender, Nondistended; Bowel sounds present  EXTREMITIES:  2+ Peripheral Pulses, No clubbing, cyanosis, or edema  LYMPH: No lymphadenopathy noted  SKIN: No rashes or lesions    LABS:                        15.4   6.79  )-----------( 211      ( 06 Jan 2018 02:42 )             45.8     01-06    140  |  98  |  17  ----------------------------<  106<H>  3.9   |  25  |  1.28    Ca    9.6      06 Jan 2018 02:42    TPro  8.0  /  Alb  4.9  /  TBili  0.7  /  DBili  x   /  AST  45<H>  /  ALT  20  /  AlkPhos  69  01-06    PT/INR - ( 06 Jan 2018 02:42 )   PT: 13.1 SEC;   INR: 1.18          PTT - ( 06 Jan 2018 02:42 )  PTT:31.7 SEC        RADIOLOGY & ADDITIONAL TESTS:    Sapna Lira  Internal Medicine PGY-2  Pager #[phone] Sapna Lira  PGY-2 Internal Medicine  [facility]: [id number]  NS: [phone]    Patient is a 57y old  Male who presents with a chief complaint of SOB (06 Jan 2018 14:50)      INTERVAL HPI/OVERNIGHT EVENTS: Pt reports feeling better but still has some SOB whenever he coughs.    MEDICATIONS (STANDING):  buDESOnide  80 MICROgram(s)/formoterol 4.5 MICROgram(s) Inhaler 2 Puff(s) Inhalation two times a day  heparin  Injectable 5000 Unit(s) SubCutaneous every 8 hours  oseltamivir 75 milliGRAM(s) Oral two times a day  predniSONE   Tablet 40 milliGRAM(s) Oral daily  sodium chloride 0.9%. 1000 milliLiter(s) IV Continuous <Continuous>    MEDICATIONS  (PRN):  ALBUTerol    90 MICROgram(s) HFA Inhaler 2 Puff(s) Inhalation every 6 hours PRN  guaiFENesin    Syrup 100 milliGRAM(s) Oral every 6 hours PRN      REVIEW OF SYSTEMS:  CONSTITUTIONAL: No fever, weight loss, or fatigue  EYES: No eye pain, visual disturbances, or discharge  ENMT:  No difficulty hearing, tinnitus, vertigo; No sinus or throat pain  NECK: No pain or stiffness  RESPIRATORY: +Cough, SOB  CARDIOVASCULAR: No chest pain, palpitations, dizziness, or leg swelling  GASTROINTESTINAL: No abdominal or epigastric pain. No nausea, vomiting, or hematemesis; No diarrhea or constipation. No melena or hematochezia.  GENITOURINARY: No dysuria, frequency, hematuria, or incontinence  NEUROLOGICAL: No headaches, memory loss, loss of strength, numbness, or tremors  SKIN: No itching, burning, rashes, or lesions   MUSCULOSKELETAL: No joint pain or swelling; No muscle, back, or extremity pain    T(F): 98.7 (01-07-18 @ 05:24), Max: 98.7 (01-07-18 @ 05:24)  HR: 93 (01-07-18 @ 05:24) (93 - 102)  BP: 146/101 (01-07-18 @ 05:24) (136/85 - 146/101)  RR: 18 (01-07-18 @ 05:24) (16 - 18)  SpO2: 100% (01-07-18 @ 05:24) (97% - 100%)  Wt(kg): --  CAPILLARY BLOOD GLUCOSE        I&O's Summary      PHYSICAL EXAM:  GENERAL: NAD, well-developed  HEAD:  Atraumatic, Normocephalic  EYES: EOMI, PERRLA, conjunctiva and sclera clear  ENMT: No tonsillar erythema, exudates, or enlargement; Moist mucous membranes  NECK: Supple, No JVD  NERVOUS SYSTEM:  Alert & Oriented X3, Good concentration; Motor Strength 5/5 B/L upper and lower extremities  CHEST/LUNG: Mild wheezing  HEART: Tachycardic; No murmurs, rubs, or gallops  ABDOMEN: Soft, Nontender, Nondistended; Bowel sounds present  EXTREMITIES:  2+ Peripheral Pulses, No clubbing, cyanosis, or edema  LYMPH: No lymphadenopathy noted  SKIN: No rashes or lesions    LABS:                        15.4   6.79  )-----------( 211      ( 06 Jan 2018 02:42 )             45.8     01-06    140  |  98  |  17  ----------------------------<  106<H>  3.9   |  25  |  1.28    Ca    9.6      06 Jan 2018 02:42    TPro  8.0  /  Alb  4.9  /  TBili  0.7  /  DBili  x   /  AST  45<H>  /  ALT  20  /  AlkPhos  69  01-06    PT/INR - ( 06 Jan 2018 02:42 )   PT: 13.1 SEC;   INR: 1.18          PTT - ( 06 Jan 2018 02:42 )  PTT:31.7 SEC        RADIOLOGY & ADDITIONAL TESTS:    Sapna Lira  Internal Medicine PGY-2  Pager #[phone]

## 2018-01-07 NOTE — PROGRESS NOTE ADULT - PROBLEM SELECTOR PLAN 3
- Patient on symbicort and albuterol at home which he only takes occasionally  - Continue symbicort/albuterol  - Pt still SOB, with mild wheezing on LLL, likely reactive airway, will treat as asthma exacerbation with oral prednisone 40 mg daily for 5 days.

## 2018-01-07 NOTE — PROGRESS NOTE ADULT - PROBLEM SELECTOR PLAN 1
Pt febrile to 101, and tachycardic on admission, meets sepsis criteria, 2/2 influenza.  - Blood cultures with no growth after 24h  - CXR with clear lungs  - Monitor off abx- likely sepsis 2/2 Influenza A

## 2018-01-07 NOTE — PROGRESS NOTE ADULT - PROBLEM SELECTOR PLAN 2
- although outside window for the best effects of Tamiflu, considering pt's symptoms, will empirically treat for 5 days  - CK increase likely related to viral myositis 2/2 influenza

## 2018-01-08 VITALS
HEART RATE: 80 BPM | SYSTOLIC BLOOD PRESSURE: 153 MMHG | DIASTOLIC BLOOD PRESSURE: 90 MMHG | OXYGEN SATURATION: 100 % | RESPIRATION RATE: 18 BRPM | TEMPERATURE: 98 F

## 2018-01-08 LAB
BUN SERPL-MCNC: 15 MG/DL — SIGNIFICANT CHANGE UP (ref 7–23)
CALCIUM SERPL-MCNC: 8.3 MG/DL — LOW (ref 8.4–10.5)
CHLORIDE SERPL-SCNC: 105 MMOL/L — SIGNIFICANT CHANGE UP (ref 98–107)
CK SERPL-CCNC: 915 U/L — HIGH (ref 30–200)
CO2 SERPL-SCNC: 25 MMOL/L — SIGNIFICANT CHANGE UP (ref 22–31)
CREAT SERPL-MCNC: 1.04 MG/DL — SIGNIFICANT CHANGE UP (ref 0.5–1.3)
GLUCOSE SERPL-MCNC: 91 MG/DL — SIGNIFICANT CHANGE UP (ref 70–99)
HCV AB S/CO SERPL IA: 0.3 S/CO — SIGNIFICANT CHANGE UP
HCV AB SERPL-IMP: SIGNIFICANT CHANGE UP
MAGNESIUM SERPL-MCNC: 1.8 MG/DL — SIGNIFICANT CHANGE UP (ref 1.6–2.6)
PHOSPHATE SERPL-MCNC: 3.2 MG/DL — SIGNIFICANT CHANGE UP (ref 2.5–4.5)
POTASSIUM SERPL-MCNC: 4 MMOL/L — SIGNIFICANT CHANGE UP (ref 3.5–5.3)
POTASSIUM SERPL-SCNC: 4 MMOL/L — SIGNIFICANT CHANGE UP (ref 3.5–5.3)
SODIUM SERPL-SCNC: 142 MMOL/L — SIGNIFICANT CHANGE UP (ref 135–145)

## 2018-01-08 PROCEDURE — 99239 HOSP IP/OBS DSCHRG MGMT >30: CPT

## 2018-01-08 RX ADMIN — Medication 40 MILLIGRAM(S): at 05:29

## 2018-01-08 RX ADMIN — BUDESONIDE AND FORMOTEROL FUMARATE DIHYDRATE 2 PUFF(S): 160; 4.5 AEROSOL RESPIRATORY (INHALATION) at 09:47

## 2018-01-08 RX ADMIN — Medication 75 MILLIGRAM(S): at 05:29

## 2018-01-08 RX ADMIN — SODIUM CHLORIDE 150 MILLILITER(S): 9 INJECTION INTRAMUSCULAR; INTRAVENOUS; SUBCUTANEOUS at 12:02

## 2018-01-08 NOTE — PROGRESS NOTE ADULT - PROBLEM SELECTOR PLAN 2
- although outside window for the best effects of Tamiflu, considering pt's symptoms, will empirically treat for 5 days (1/6-1/10)  - CK increase likely related to viral myositis 2/2 influenza. Downtrending.

## 2018-01-08 NOTE — PROGRESS NOTE ADULT - PROBLEM SELECTOR PLAN 1
On admission, pt febrile to 101, and tachycardic, meets sepsis criteria, 2/2 influenza. Improving and pt AFVSS.  - Blood cultures with no growth after 24h  - CXR with clear lungs  - Monitor off abx- likely sepsis 2/2 Influenza A

## 2018-01-08 NOTE — PROGRESS NOTE ADULT - PROBLEM SELECTOR PLAN 3
SOB Improved and is ambulating w/o difficulty. Mild wheezing on exam.  - c/w oral prednisone 40 mg daily for 5 days. (1/6-1/10)  - Patient on symbicort and albuterol at home which he only takes occasionally  - Continue symbicort/albuterol

## 2018-01-08 NOTE — PROGRESS NOTE ADULT - ATTENDING COMMENTS
SOB resolved, CK downtrending, medically stable for d/c home today w/ PCP f/u within 1-2 weeks
Pt was seen and examed at bedside with resident.  56 yo M PMH asthma p/w SOB that is noticeable most while coughing, tachycardia, and elevated CK, likely related to + RVP for influenza. Pt had fever 101, and tachycardia on admission, met sepsis criteria, due to influenza. Consider pt symptoms, empirically treated with influenza. f/u Bx. CXR clear lungs. Pt has mild wheezing worse on LLL on examination, likely reactive airway, also will give 5 days oral prednisone. continue Symbicort and albuterol.   Pt was found to have elevated CPK possible viral myositis 2/2 influenza, increase IVF to 150cc/hr, trending CK levels.  D/c planning home once CK trending down and SOB improved.

## 2018-01-11 LAB
BACTERIA BLD CULT: SIGNIFICANT CHANGE UP
BACTERIA BLD CULT: SIGNIFICANT CHANGE UP

## 2018-09-16 ENCOUNTER — EMERGENCY (EMERGENCY)
Facility: HOSPITAL | Age: 58
LOS: 1 days | Discharge: HOME | End: 2018-09-16
Attending: EMERGENCY MEDICINE | Admitting: EMERGENCY MEDICINE
Payer: COMMERCIAL

## 2018-09-16 VITALS
RESPIRATION RATE: 18 BRPM | SYSTOLIC BLOOD PRESSURE: 173 MMHG | OXYGEN SATURATION: 98 % | HEART RATE: 109 BPM | DIASTOLIC BLOOD PRESSURE: 97 MMHG | TEMPERATURE: 98 F

## 2018-09-16 VITALS
RESPIRATION RATE: 18 BRPM | HEART RATE: 99 BPM | SYSTOLIC BLOOD PRESSURE: 157 MMHG | OXYGEN SATURATION: 96 % | DIASTOLIC BLOOD PRESSURE: 111 MMHG

## 2018-09-16 PROCEDURE — 71046 X-RAY EXAM CHEST 2 VIEWS: CPT | Mod: 26

## 2018-09-16 PROCEDURE — 99284 EMERGENCY DEPT VISIT MOD MDM: CPT | Mod: 25

## 2018-09-16 RX ORDER — IPRATROPIUM/ALBUTEROL SULFATE 18-103MCG
3 AEROSOL WITH ADAPTER (GRAM) INHALATION ONCE
Refills: 0 | Status: COMPLETED | OUTPATIENT
Start: 2018-09-16 | End: 2018-09-16

## 2018-09-16 RX ADMIN — Medication 60 MILLIGRAM(S): at 00:53

## 2018-09-16 RX ADMIN — Medication 3 MILLILITER(S): at 03:29

## 2018-09-16 RX ADMIN — Medication 3 MILLILITER(S): at 00:35

## 2018-09-16 NOTE — ED PROVIDER NOTE - PHYSICAL EXAMINATION
GEN - NAD; well appearing; A+O x3   HEAD - NC/AT     EYES - EOMI, no conjunctival pallor, no scleral icterus  ENT -   mucous membranes  moist , no discharge, no post oropharynx erythema  NECK - Neck supple  PULM - diffuse wheezing, mild tachypnea, speaking in full sentences  COR -  RRR, S1 S2, no murmurs  ABD - , ND, NT, soft, no guarding, no rebound, no masses    BACK - no CVA tenderness, nontender spine     EXTREMS - no edema, no deformity, warm and well perfused    SKIN - no rash or bruising      NEUROLOGIC - alert, sensation nl, motor 5/5 RUE/LUE/RLE/LLE

## 2018-09-16 NOTE — ED PROVIDER NOTE - ATTENDING CONTRIBUTION TO CARE
Dr. Castillo: I have personally performed a face to face bedside history and physical examination of this patient. I have discussed the history, examination, review of systems, assessment and plan of management with the resident. I have reviewed the electronic medical record and amended it to reflect my history, review of systems, physical exam, assessment and plan.

## 2018-09-16 NOTE — ED ADULT NURSE REASSESSMENT NOTE - NS ED NURSE REASSESS COMMENT FT1
report received from break RICKY Olmos. Pt finshed nebulizer treatment and states he no longer is "breathing out of his nose". Pt denies SOB, difficulty breathing at this time. Pt lung sounds have inspiratory and expiratory wheezes bilaterally. However, pt is satting 96% on room air, and able to speak full sentences with no signs of increased work of breathing like nasal flaring, accessory muscle usage, or tachypnea. Pt denies any pain at this time.

## 2018-09-16 NOTE — ED ADULT NURSE NOTE - OBJECTIVE STATEMENT
Pt received in #1, aaox3 with c/o sob stating "its my asthma". Pt able to speak in complete sentences but becomes sob at the end of his sentence. Pt denies cp, nausea, vomiting, dizziness, diaphoresis, intubations or icu admissions related to asthma.

## 2018-09-16 NOTE — ED PROVIDER NOTE - OBJECTIVE STATEMENT
57M with pmh of asthma (on Symbicort) p/w asthma exacerbation x 1 day. +wheezing, cough with green sputum, sob and chest tightness x 1 day. Denies cp, n/v, sore throat, leg swelling. +nasal congestion. Last hospitalization in the winter. No prior intubations or ICU stays. Pt occasionally smokes marijuana, no tobacco.

## 2018-09-16 NOTE — ED PROVIDER NOTE - MEDICAL DECISION MAKING DETAILS
likely asthma exacerbation, will r/o underlying PNA. vs bronchitis. do not think CHF or ACS. Plan for nebs, steroids, CXR, re-eval.

## 2018-09-16 NOTE — ED PROVIDER NOTE - PROGRESS NOTE DETAILS
Anna: pt feels improved, still with scattered wheezes. will give another duoneb while awaiting CXR read (lungs look clear my wet read). feels better. occasional wheezing. pt prefers to go home. has albuterol at home. will d/c with prednisone.

## 2018-09-16 NOTE — ED ADULT TRIAGE NOTE - CHIEF COMPLAINT QUOTE
asthma    b/l wheezing, coughing green sputum. denies hx of intubation and recent steroid use except for symbicort

## 2020-09-24 NOTE — ED ADULT NURSE NOTE - NS ED NOTE  TALK SOMEONE YN
What Type Of Note Output Would You Prefer (Optional)?: Standard Output What Is The Reason For Today's Visit?: Skin Lesions What Is The Reason For Today's Visit? (Being Monitored For X): concerning skin lesions on an annual basis How Severe Are Your Spot(S)?: mild No

## 2022-03-15 NOTE — PROGRESS NOTE ADULT - PROBLEM/PLAN-2
DISPLAY PLAN FREE TEXT
DISPLAY PLAN FREE TEXT
Normal vision: sees adequately in most situations; can see medication labels, newsprint

## 2023-08-15 ENCOUNTER — EMERGENCY (EMERGENCY)
Facility: HOSPITAL | Age: 63
LOS: 1 days | Discharge: HOME | End: 2023-08-15
Attending: EMERGENCY MEDICINE | Admitting: EMERGENCY MEDICINE
Payer: COMMERCIAL

## 2023-08-15 ENCOUNTER — INPATIENT (INPATIENT)
Facility: HOSPITAL | Age: 63
LOS: 4 days | Discharge: HOME | End: 2023-08-20
Attending: INTERNAL MEDICINE | Admitting: INTERNAL MEDICINE
Payer: COMMERCIAL

## 2023-08-15 VITALS
OXYGEN SATURATION: 98 % | TEMPERATURE: 99 F | DIASTOLIC BLOOD PRESSURE: 96 MMHG | RESPIRATION RATE: 24 BRPM | SYSTOLIC BLOOD PRESSURE: 155 MMHG | HEART RATE: 108 BPM

## 2023-08-15 VITALS
TEMPERATURE: 98 F | SYSTOLIC BLOOD PRESSURE: 151 MMHG | DIASTOLIC BLOOD PRESSURE: 90 MMHG | RESPIRATION RATE: 24 BRPM | HEART RATE: 134 BPM | OXYGEN SATURATION: 94 %

## 2023-08-15 VITALS — HEART RATE: 104 BPM

## 2023-08-15 DIAGNOSIS — J45.901 UNSPECIFIED ASTHMA WITH (ACUTE) EXACERBATION: ICD-10-CM

## 2023-08-15 PROBLEM — J45.909 UNSPECIFIED ASTHMA, UNCOMPLICATED: Chronic | Status: ACTIVE | Noted: 2018-01-06

## 2023-08-15 LAB
ALBUMIN SERPL ELPH-MCNC: 4.2 G/DL — SIGNIFICANT CHANGE UP (ref 3.3–5)
ALBUMIN SERPL ELPH-MCNC: 4.8 G/DL — SIGNIFICANT CHANGE UP (ref 3.3–5)
ALP SERPL-CCNC: 68 U/L — SIGNIFICANT CHANGE UP (ref 40–120)
ALP SERPL-CCNC: 72 U/L — SIGNIFICANT CHANGE UP (ref 40–120)
ALT FLD-CCNC: 17 U/L — SIGNIFICANT CHANGE UP (ref 4–41)
ALT FLD-CCNC: 19 U/L — SIGNIFICANT CHANGE UP (ref 4–41)
ANION GAP SERPL CALC-SCNC: 14 MMOL/L — SIGNIFICANT CHANGE UP (ref 7–14)
ANION GAP SERPL CALC-SCNC: 14 MMOL/L — SIGNIFICANT CHANGE UP (ref 7–14)
AST SERPL-CCNC: 21 U/L — SIGNIFICANT CHANGE UP (ref 4–40)
AST SERPL-CCNC: 22 U/L — SIGNIFICANT CHANGE UP (ref 4–40)
B PERT DNA SPEC QL NAA+PROBE: SIGNIFICANT CHANGE UP
B PERT+PARAPERT DNA PNL SPEC NAA+PROBE: SIGNIFICANT CHANGE UP
BASE EXCESS BLDV CALC-SCNC: -0.2 MMOL/L — SIGNIFICANT CHANGE UP (ref -2–3)
BASE EXCESS BLDV CALC-SCNC: -5.4 MMOL/L — LOW (ref -2–3)
BASOPHILS # BLD AUTO: 0.01 K/UL — SIGNIFICANT CHANGE UP (ref 0–0.2)
BASOPHILS # BLD AUTO: 0.03 K/UL — SIGNIFICANT CHANGE UP (ref 0–0.2)
BASOPHILS NFR BLD AUTO: 0.1 % — SIGNIFICANT CHANGE UP (ref 0–2)
BASOPHILS NFR BLD AUTO: 0.5 % — SIGNIFICANT CHANGE UP (ref 0–2)
BILIRUB SERPL-MCNC: 0.4 MG/DL — SIGNIFICANT CHANGE UP (ref 0.2–1.2)
BILIRUB SERPL-MCNC: 0.5 MG/DL — SIGNIFICANT CHANGE UP (ref 0.2–1.2)
BLOOD GAS VENOUS COMPREHENSIVE RESULT: SIGNIFICANT CHANGE UP
BLOOD GAS VENOUS COMPREHENSIVE RESULT: SIGNIFICANT CHANGE UP
BORDETELLA PARAPERTUSSIS (RAPRVP): SIGNIFICANT CHANGE UP
BUN SERPL-MCNC: 11 MG/DL — SIGNIFICANT CHANGE UP (ref 7–23)
BUN SERPL-MCNC: 12 MG/DL — SIGNIFICANT CHANGE UP (ref 7–23)
C PNEUM DNA SPEC QL NAA+PROBE: SIGNIFICANT CHANGE UP
CALCIUM SERPL-MCNC: 9.3 MG/DL — SIGNIFICANT CHANGE UP (ref 8.4–10.5)
CALCIUM SERPL-MCNC: 9.6 MG/DL — SIGNIFICANT CHANGE UP (ref 8.4–10.5)
CHLORIDE BLDV-SCNC: 101 MMOL/L — SIGNIFICANT CHANGE UP (ref 96–108)
CHLORIDE BLDV-SCNC: 102 MMOL/L — SIGNIFICANT CHANGE UP (ref 96–108)
CHLORIDE SERPL-SCNC: 101 MMOL/L — SIGNIFICANT CHANGE UP (ref 98–107)
CHLORIDE SERPL-SCNC: 102 MMOL/L — SIGNIFICANT CHANGE UP (ref 98–107)
CO2 BLDV-SCNC: 21.9 MMOL/L — LOW (ref 22–26)
CO2 BLDV-SCNC: 25.1 MMOL/L — SIGNIFICANT CHANGE UP (ref 22–26)
CO2 SERPL-SCNC: 24 MMOL/L — SIGNIFICANT CHANGE UP (ref 22–31)
CO2 SERPL-SCNC: 24 MMOL/L — SIGNIFICANT CHANGE UP (ref 22–31)
CREAT SERPL-MCNC: 0.94 MG/DL — SIGNIFICANT CHANGE UP (ref 0.5–1.3)
CREAT SERPL-MCNC: 1 MG/DL — SIGNIFICANT CHANGE UP (ref 0.5–1.3)
D DIMER BLD IA.RAPID-MCNC: <150 NG/ML DDU — SIGNIFICANT CHANGE UP
EGFR: 85 ML/MIN/1.73M2 — SIGNIFICANT CHANGE UP
EGFR: 91 ML/MIN/1.73M2 — SIGNIFICANT CHANGE UP
EOSINOPHIL # BLD AUTO: 0 K/UL — SIGNIFICANT CHANGE UP (ref 0–0.5)
EOSINOPHIL # BLD AUTO: 0.12 K/UL — SIGNIFICANT CHANGE UP (ref 0–0.5)
EOSINOPHIL NFR BLD AUTO: 0 % — SIGNIFICANT CHANGE UP (ref 0–6)
EOSINOPHIL NFR BLD AUTO: 2 % — SIGNIFICANT CHANGE UP (ref 0–6)
FLUAV SUBTYP SPEC NAA+PROBE: SIGNIFICANT CHANGE UP
FLUBV RNA SPEC QL NAA+PROBE: SIGNIFICANT CHANGE UP
GAS PNL BLDV: 134 MMOL/L — LOW (ref 136–145)
GAS PNL BLDV: 136 MMOL/L — SIGNIFICANT CHANGE UP (ref 136–145)
GLUCOSE BLDV-MCNC: 156 MG/DL — HIGH (ref 70–99)
GLUCOSE BLDV-MCNC: 213 MG/DL — HIGH (ref 70–99)
GLUCOSE SERPL-MCNC: 103 MG/DL — HIGH (ref 70–99)
GLUCOSE SERPL-MCNC: 160 MG/DL — HIGH (ref 70–99)
HADV DNA SPEC QL NAA+PROBE: SIGNIFICANT CHANGE UP
HCO3 BLDV-SCNC: 21 MMOL/L — LOW (ref 22–29)
HCO3 BLDV-SCNC: 24 MMOL/L — SIGNIFICANT CHANGE UP (ref 22–29)
HCOV 229E RNA SPEC QL NAA+PROBE: SIGNIFICANT CHANGE UP
HCOV HKU1 RNA SPEC QL NAA+PROBE: SIGNIFICANT CHANGE UP
HCOV NL63 RNA SPEC QL NAA+PROBE: SIGNIFICANT CHANGE UP
HCOV OC43 RNA SPEC QL NAA+PROBE: SIGNIFICANT CHANGE UP
HCT VFR BLD CALC: 42 % — SIGNIFICANT CHANGE UP (ref 39–50)
HCT VFR BLD CALC: 42.5 % — SIGNIFICANT CHANGE UP (ref 39–50)
HCT VFR BLDA CALC: 44 % — SIGNIFICANT CHANGE UP (ref 39–51)
HCT VFR BLDA CALC: 45 % — SIGNIFICANT CHANGE UP (ref 39–51)
HGB BLD CALC-MCNC: 14.5 G/DL — SIGNIFICANT CHANGE UP (ref 12.6–17.4)
HGB BLD CALC-MCNC: 14.9 G/DL — SIGNIFICANT CHANGE UP (ref 12.6–17.4)
HGB BLD-MCNC: 13.8 G/DL — SIGNIFICANT CHANGE UP (ref 13–17)
HGB BLD-MCNC: 14.1 G/DL — SIGNIFICANT CHANGE UP (ref 13–17)
HMPV RNA SPEC QL NAA+PROBE: SIGNIFICANT CHANGE UP
HPIV1 RNA SPEC QL NAA+PROBE: SIGNIFICANT CHANGE UP
HPIV2 RNA SPEC QL NAA+PROBE: SIGNIFICANT CHANGE UP
HPIV3 RNA SPEC QL NAA+PROBE: DETECTED
HPIV4 RNA SPEC QL NAA+PROBE: SIGNIFICANT CHANGE UP
IANC: 4.12 K/UL — SIGNIFICANT CHANGE UP (ref 1.8–7.4)
IANC: 7.49 K/UL — HIGH (ref 1.8–7.4)
IMM GRANULOCYTES NFR BLD AUTO: 0.2 % — SIGNIFICANT CHANGE UP (ref 0–0.9)
IMM GRANULOCYTES NFR BLD AUTO: 0.3 % — SIGNIFICANT CHANGE UP (ref 0–0.9)
LACTATE BLDV-MCNC: 3.3 MMOL/L — HIGH (ref 0.5–2)
LACTATE BLDV-MCNC: 6.7 MMOL/L — CRITICAL HIGH (ref 0.5–2)
LYMPHOCYTES # BLD AUTO: 0.43 K/UL — LOW (ref 1–3.3)
LYMPHOCYTES # BLD AUTO: 0.98 K/UL — LOW (ref 1–3.3)
LYMPHOCYTES # BLD AUTO: 16.4 % — SIGNIFICANT CHANGE UP (ref 13–44)
LYMPHOCYTES # BLD AUTO: 5.3 % — LOW (ref 13–44)
M PNEUMO DNA SPEC QL NAA+PROBE: SIGNIFICANT CHANGE UP
MAGNESIUM SERPL-MCNC: 1.9 MG/DL — SIGNIFICANT CHANGE UP (ref 1.6–2.6)
MCHC RBC-ENTMCNC: 29.2 PG — SIGNIFICANT CHANGE UP (ref 27–34)
MCHC RBC-ENTMCNC: 29.6 PG — SIGNIFICANT CHANGE UP (ref 27–34)
MCHC RBC-ENTMCNC: 32.5 GM/DL — SIGNIFICANT CHANGE UP (ref 32–36)
MCHC RBC-ENTMCNC: 33.6 GM/DL — SIGNIFICANT CHANGE UP (ref 32–36)
MCV RBC AUTO: 88.1 FL — SIGNIFICANT CHANGE UP (ref 80–100)
MCV RBC AUTO: 89.9 FL — SIGNIFICANT CHANGE UP (ref 80–100)
MONOCYTES # BLD AUTO: 0.14 K/UL — SIGNIFICANT CHANGE UP (ref 0–0.9)
MONOCYTES # BLD AUTO: 0.71 K/UL — SIGNIFICANT CHANGE UP (ref 0–0.9)
MONOCYTES NFR BLD AUTO: 1.7 % — LOW (ref 2–14)
MONOCYTES NFR BLD AUTO: 11.9 % — SIGNIFICANT CHANGE UP (ref 2–14)
NEUTROPHILS # BLD AUTO: 4.12 K/UL — SIGNIFICANT CHANGE UP (ref 1.8–7.4)
NEUTROPHILS # BLD AUTO: 7.49 K/UL — HIGH (ref 1.8–7.4)
NEUTROPHILS NFR BLD AUTO: 68.9 % — SIGNIFICANT CHANGE UP (ref 43–77)
NEUTROPHILS NFR BLD AUTO: 92.7 % — HIGH (ref 43–77)
NRBC # BLD: 0 /100 WBCS — SIGNIFICANT CHANGE UP (ref 0–0)
NRBC # BLD: 0 /100 WBCS — SIGNIFICANT CHANGE UP (ref 0–0)
NRBC # FLD: 0 K/UL — SIGNIFICANT CHANGE UP (ref 0–0)
NRBC # FLD: 0 K/UL — SIGNIFICANT CHANGE UP (ref 0–0)
NT-PROBNP SERPL-SCNC: 194 PG/ML — SIGNIFICANT CHANGE UP
PCO2 BLDV: 37 MMHG — LOW (ref 42–55)
PCO2 BLDV: 41 MMHG — LOW (ref 42–55)
PH BLDV: 7.31 — LOW (ref 7.32–7.43)
PH BLDV: 7.42 — SIGNIFICANT CHANGE UP (ref 7.32–7.43)
PLATELET # BLD AUTO: 224 K/UL — SIGNIFICANT CHANGE UP (ref 150–400)
PLATELET # BLD AUTO: 228 K/UL — SIGNIFICANT CHANGE UP (ref 150–400)
PO2 BLDV: 119 MMHG — HIGH (ref 25–45)
PO2 BLDV: 71 MMHG — HIGH (ref 25–45)
POTASSIUM BLDV-SCNC: 3.4 MMOL/L — LOW (ref 3.5–5.1)
POTASSIUM BLDV-SCNC: 3.9 MMOL/L — SIGNIFICANT CHANGE UP (ref 3.5–5.1)
POTASSIUM SERPL-MCNC: 3.4 MMOL/L — LOW (ref 3.5–5.3)
POTASSIUM SERPL-MCNC: 3.6 MMOL/L — SIGNIFICANT CHANGE UP (ref 3.5–5.3)
POTASSIUM SERPL-SCNC: 3.4 MMOL/L — LOW (ref 3.5–5.3)
POTASSIUM SERPL-SCNC: 3.6 MMOL/L — SIGNIFICANT CHANGE UP (ref 3.5–5.3)
PROT SERPL-MCNC: 6.9 G/DL — SIGNIFICANT CHANGE UP (ref 6–8.3)
PROT SERPL-MCNC: 7.7 G/DL — SIGNIFICANT CHANGE UP (ref 6–8.3)
RAPID RVP RESULT: DETECTED
RBC # BLD: 4.73 M/UL — SIGNIFICANT CHANGE UP (ref 4.2–5.8)
RBC # BLD: 4.77 M/UL — SIGNIFICANT CHANGE UP (ref 4.2–5.8)
RBC # FLD: 13.1 % — SIGNIFICANT CHANGE UP (ref 10.3–14.5)
RBC # FLD: 13.2 % — SIGNIFICANT CHANGE UP (ref 10.3–14.5)
RSV RNA SPEC QL NAA+PROBE: SIGNIFICANT CHANGE UP
RV+EV RNA SPEC QL NAA+PROBE: SIGNIFICANT CHANGE UP
SAO2 % BLDV: 94.8 % — HIGH (ref 67–88)
SAO2 % BLDV: 99 % — HIGH (ref 67–88)
SARS-COV-2 RNA SPEC QL NAA+PROBE: SIGNIFICANT CHANGE UP
SODIUM SERPL-SCNC: 139 MMOL/L — SIGNIFICANT CHANGE UP (ref 135–145)
SODIUM SERPL-SCNC: 140 MMOL/L — SIGNIFICANT CHANGE UP (ref 135–145)
TROPONIN T, HIGH SENSITIVITY RESULT: 14 NG/L — SIGNIFICANT CHANGE UP
WBC # BLD: 5.98 K/UL — SIGNIFICANT CHANGE UP (ref 3.8–10.5)
WBC # BLD: 8.09 K/UL — SIGNIFICANT CHANGE UP (ref 3.8–10.5)
WBC # FLD AUTO: 5.98 K/UL — SIGNIFICANT CHANGE UP (ref 3.8–10.5)
WBC # FLD AUTO: 8.09 K/UL — SIGNIFICANT CHANGE UP (ref 3.8–10.5)

## 2023-08-15 PROCEDURE — 93308 TTE F-UP OR LMTD: CPT | Mod: 26,GC

## 2023-08-15 PROCEDURE — 99291 CRITICAL CARE FIRST HOUR: CPT | Mod: GC

## 2023-08-15 PROCEDURE — 99292 CRITICAL CARE ADDL 30 MIN: CPT | Mod: GC,25

## 2023-08-15 PROCEDURE — 99291 CRITICAL CARE FIRST HOUR: CPT

## 2023-08-15 PROCEDURE — 76604 US EXAM CHEST: CPT | Mod: 26,GC

## 2023-08-15 PROCEDURE — 71046 X-RAY EXAM CHEST 2 VIEWS: CPT | Mod: 26

## 2023-08-15 PROCEDURE — 93010 ELECTROCARDIOGRAM REPORT: CPT

## 2023-08-15 PROCEDURE — 99292 CRITICAL CARE ADDL 30 MIN: CPT

## 2023-08-15 RX ORDER — ALBUTEROL 90 UG/1
2 AEROSOL, METERED ORAL
Qty: 1 | Refills: 0
Start: 2023-08-15 | End: 2023-08-15

## 2023-08-15 RX ORDER — CHLORHEXIDINE GLUCONATE 213 G/1000ML
1 SOLUTION TOPICAL
Refills: 0 | Status: DISCONTINUED | OUTPATIENT
Start: 2023-08-15 | End: 2023-08-20

## 2023-08-15 RX ORDER — MAGNESIUM SULFATE 500 MG/ML
2 VIAL (ML) INJECTION ONCE
Refills: 0 | Status: COMPLETED | OUTPATIENT
Start: 2023-08-15 | End: 2023-08-15

## 2023-08-15 RX ORDER — ALBUTEROL 90 UG/1
2.5 AEROSOL, METERED ORAL
Refills: 0 | Status: DISCONTINUED | OUTPATIENT
Start: 2023-08-15 | End: 2023-08-15

## 2023-08-15 RX ORDER — ALBUTEROL 90 UG/1
2.5 AEROSOL, METERED ORAL ONCE
Refills: 0 | Status: COMPLETED | OUTPATIENT
Start: 2023-08-15 | End: 2023-08-15

## 2023-08-15 RX ORDER — EPINEPHRINE 0.3 MG/.3ML
0.3 INJECTION INTRAMUSCULAR; SUBCUTANEOUS ONCE
Refills: 0 | Status: DISCONTINUED | OUTPATIENT
Start: 2023-08-15 | End: 2023-08-15

## 2023-08-15 RX ORDER — ALBUTEROL 90 UG/1
10 AEROSOL, METERED ORAL
Qty: 100 | Refills: 0 | Status: DISCONTINUED | OUTPATIENT
Start: 2023-08-15 | End: 2023-08-16

## 2023-08-15 RX ORDER — IPRATROPIUM/ALBUTEROL SULFATE 18-103MCG
3 AEROSOL WITH ADAPTER (GRAM) INHALATION ONCE
Refills: 0 | Status: DISCONTINUED | OUTPATIENT
Start: 2023-08-15 | End: 2023-08-15

## 2023-08-15 RX ORDER — IPRATROPIUM/ALBUTEROL SULFATE 18-103MCG
3 AEROSOL WITH ADAPTER (GRAM) INHALATION
Refills: 0 | Status: COMPLETED | OUTPATIENT
Start: 2023-08-15 | End: 2023-08-15

## 2023-08-15 RX ORDER — DEXAMETHASONE 0.5 MG/5ML
10 ELIXIR ORAL ONCE
Refills: 0 | Status: COMPLETED | OUTPATIENT
Start: 2023-08-15 | End: 2023-08-15

## 2023-08-15 RX ORDER — KETAMINE HYDROCHLORIDE 100 MG/ML
20 INJECTION INTRAMUSCULAR; INTRAVENOUS ONCE
Refills: 0 | Status: DISCONTINUED | OUTPATIENT
Start: 2023-08-15 | End: 2023-08-15

## 2023-08-15 RX ORDER — DEXAMETHASONE 0.5 MG/5ML
10 ELIXIR ORAL DAILY
Refills: 0 | Status: DISCONTINUED | OUTPATIENT
Start: 2023-08-16 | End: 2023-08-16

## 2023-08-15 RX ORDER — IPRATROPIUM/ALBUTEROL SULFATE 18-103MCG
3 AEROSOL WITH ADAPTER (GRAM) INHALATION ONCE
Refills: 0 | Status: COMPLETED | OUTPATIENT
Start: 2023-08-15 | End: 2023-08-15

## 2023-08-15 RX ADMIN — ALBUTEROL 2.5 MILLIGRAM(S): 90 AEROSOL, METERED ORAL at 22:04

## 2023-08-15 RX ADMIN — Medication 2 GRAM(S): at 23:53

## 2023-08-15 RX ADMIN — Medication 40 MILLIGRAM(S): at 06:04

## 2023-08-15 RX ADMIN — Medication 3 MILLILITER(S): at 19:04

## 2023-08-15 RX ADMIN — Medication 150 GRAM(S): at 23:16

## 2023-08-15 RX ADMIN — Medication 3 MILLILITER(S): at 06:25

## 2023-08-15 RX ADMIN — Medication 150 GRAM(S): at 07:12

## 2023-08-15 RX ADMIN — Medication 3 MILLILITER(S): at 06:24

## 2023-08-15 RX ADMIN — Medication 50 GRAM(S): at 22:48

## 2023-08-15 RX ADMIN — ALBUTEROL 2.5 MILLIGRAM(S): 90 AEROSOL, METERED ORAL at 07:14

## 2023-08-15 RX ADMIN — Medication 3 MILLILITER(S): at 06:04

## 2023-08-15 RX ADMIN — Medication 102 MILLIGRAM(S): at 22:30

## 2023-08-15 RX ADMIN — KETAMINE HYDROCHLORIDE 20 MILLIGRAM(S): 100 INJECTION INTRAMUSCULAR; INTRAVENOUS at 22:30

## 2023-08-15 NOTE — ED PROVIDER NOTE - PHYSICAL EXAMINATION
PHYSICAL EXAM:  CONSTITUTIONAL: Well appearing, awake, alert, oriented to person, place, time/situation and in no apparent distress.  HEAD: Atraumatic, no step offs.  NECK: Supple, no meningismus.   EYES: Clear bilaterally, pupils equal, round and reactive to light.  ENMT: Airway patent, Nasal mucosa clear. Mouth with normal mucosa. Uvula is midline.   CARDIAC: Normal rate, regular rhythm. +S1/S2. No murmurs, rubs or gallops.  RESPIRATORY: Breathing unlabored. Diffuse wheezes bilaterally.  ABDOMEN:  Soft, nontender, nondistended. No rebound tenderness or guarding.  FLANK: No CVA tenderness B/L.  NEUROLOGICAL: Alert and oriented, no focal deficits, no motor or sensory deficits.   MSK: No clubbing, cyanosis, or edema. Full range of motion of all extremities.   SKIN: Skin warm and dry. No evidence of rashes or lesions.

## 2023-08-15 NOTE — H&P ADULT - ASSESSMENT
Pt is a 61 yo M with PMHx of asthma on qd Symbicort does not have an albuterol inh, current cannabis smoker presented to the ED earlier this morning with 3 days of asthma exacerbation, found to be parainfluenza+, CXR showing clear lungs no consolidation, left after receiving treatment and feeling better but then re-presented to ED same day for SOB again, decreased air movement in all lung fields, put on BIPAP.    Admitted to MICU for new BIPAP i/s/o severe asthma exacerbations.     Labs remarkable for K 3.4 (received albuterol in earlier ED visit), EKG showing no signs of ischemia. Pt placed on BIPAP, received duoneb, decadron 10, Mag 4g, albuterol neb, epi .3 IM felt better then acutely worsened.  Pt was then unable to tolerate bipap, given ketamine 20, started tolerating BIPAP.    === Neuro ===  - no active issues    === Cardio ===  - ECG/QTc: no signs of ischemia  - no active issue    === Respiratory ===  # Severe asthma exacerbation likely triggered by viral infection  - VBG: pH 7.42, lactate 3.3, pCO2 37  - Parainfluenza +  - CXR showing clear lung, no consolidations  - s/p epi, multiple pushes of Mg, duonebs, albuterol in ED  - s/p steroids prednisone 40, decadron 10  - on BIPAP w continuous albuterol  - in AM, c/w steroids  - if pt decompensates, will intubate with ketamine and epinephrine, otherwise trying to avoid intubation    === GI/nutrition ===  - Diet: NPO while on BIPAP  - no active issues     === ID ===  # Parainfluenza positive  - see respiratory above    === /Renal ===  - no active issues    === Endocrine ===  - no active issues     === Heme ===  - no DVT ppx for now  - no active issues    === Ethics ===  - Code status: full. Pt requesting no intubation if possible.  - Lines: PIV         Pt is a 61 yo M with PMHx of asthma on qd Symbicort does not have an albuterol inh, current cannabis smoker presented to the ED earlier this morning with 3 days of asthma exacerbation, found to be parainfluenza+, CXR showing clear lungs no consolidation, left after receiving treatment and feeling better but then re-presented to ED same day for SOB again, decreased air movement in all lung fields, put on BIPAP.    Admitted to MICU for new BIPAP i/s/o severe asthma exacerbations.     Labs remarkable for K 3.4 (received albuterol in earlier ED visit), EKG showing no signs of ischemia. Pt placed on BIPAP, received duoneb, decadron 10, Mag 4g, albuterol neb, epi .3 IM felt better then acutely worsened.  Pt was then unable to tolerate bipap, given ketamine 20, started tolerating BIPAP.    === Neuro ===  - no active issues    === Cardio ===  - ECG/QTc: no signs of ischemia  - no active issue    === Respiratory ===  # Severe asthma exacerbation likely triggered by viral infection  - VBG: pH 7.42, lactate 3.3, pCO2 37  - Parainfluenza +  - CXR showing clear lung, no consolidations  - s/p epi, multiple pushes of Mg, duonebs, albuterol in ED  - s/p steroids prednisone 40, decadron 10  - on BIPAP w continuous albuterol  - 2nd day of steroids 8/16 noon 12mg decadron IV (rec decadron 12-16mg or other steroid for 1-2 days in acute asthma exac)  - if pt decompensates, will intubate with ketamine and epinephrine, otherwise trying to avoid intubation    === GI/nutrition ===  - Diet: NPO while on BIPAP  - no active issues     === ID ===  # Parainfluenza positive  - see respiratory above    === /Renal ===  - no active issues    === Endocrine ===  - no active issues     === Heme ===  - no DVT ppx for now  - no active issues    === Ethics ===  - Code status: full. Pt requesting no intubation if possible.  - Lines: PIV         Pt is a 63 yo M with PMHx of asthma on qd Symbicort does not have an albuterol inh, current cannabis smoker presented to the ED earlier this morning with 3 days of asthma exacerbation, found to be parainfluenza+, CXR showing clear lungs no consolidation, left after receiving treatment and feeling better but then re-presented to ED same day for SOB again, decreased air movement in all lung fields, put on BIPAP.    Admitted to MICU for new BIPAP i/s/o severe asthma exacerbations.     Labs remarkable for K 3.4 (received albuterol in earlier ED visit), EKG showing no signs of ischemia. Pt placed on BIPAP, received duoneb, decadron 10, Mag 4g, albuterol neb, epi .3 IM felt better then acutely worsened.  Pt was then unable to tolerate bipap, given ketamine 20, started tolerating BIPAP.    === Neuro ===  - no active issues    === Cardio ===  - ECG/QTc: no signs of ischemia  - no active issue    === Respiratory ===  # Severe asthma exacerbation likely triggered by viral infection  - VBG: pH 7.42, lactate 3.3, pCO2 37  - Parainfluenza +  - CXR showing clear lung, no consolidations  - s/p epi, multiple pushes of Mg, duonebs, albuterol in ED  - s/p steroids prednisone 40, decadron 10  - on BIPAP w continuous albuterol  - decide in AM 8/16 for second day of steroids (rec decadron 12-16mg or other steroid for 1-2 days in acute asthma exac)  - if pt decompensates, will intubate with ketamine and epinephrine, otherwise trying to avoid intubation    === GI/nutrition ===  - Diet: NPO while on BIPAP  - no active issues     === ID ===  # Parainfluenza positive  - see respiratory above    === /Renal ===  - no active issues    === Endocrine ===  - no active issues     === Heme ===  - no DVT ppx for now  - no active issues    === Ethics ===  - Code status: full. Pt requesting no intubation if possible.  - Lines: PIV         Pt is a 63 yo M with PMHx of asthma on qd Symbicort does not have an albuterol inh, current cannabis smoker presented to the ED earlier this morning with 3 days of asthma exacerbation, found to be parainfluenza+, CXR showing clear lungs no consolidation, left after receiving treatment and feeling better but then re-presented to ED same day for SOB again, decreased air movement in all lung fields, put on BIPAP.    Admitted to MICU for new BIPAP i/s/o severe asthma exacerbations.    === Neuro ===  - no active issues    === Cardio ===  - ECG/QTc: no signs of ischemia  - no active issue    === Respiratory ===  # Severe asthma exacerbation likely triggered by viral infection  - VBG: pH 7.42, lactate 3.3, pCO2 37  - Parainfluenza +  - CXR showing clear lung, no consolidations  - s/p epi, multiple pushes of Mg, duonebs, albuterol in ED  - s/p steroids prednisone 40, decadron 10  - s/p ketamine for toleration of BIPAP  - on BIPAP w continuous albuterol  - decide in AM 8/16 for second day of steroids (rec decadron 12-16mg or other steroid for 1-2 days in acute asthma exac)  - if pt decompensates, will intubate with ketamine and epinephrine, otherwise trying to avoid intubation    === GI/nutrition ===  - Diet: NPO while on BIPAP  - no active issues     === ID ===  # Parainfluenza positive  - see respiratory above    === /Renal ===  - no active issues    === Endocrine ===  - no active issues     === Heme ===  - no DVT ppx for now  - no active issues    === Ethics ===  - Code status: full. Pt requesting no intubation if possible.  - Lines: PIV         Pt is a 61 yo M with PMHx of asthma on qd Symbicort does not have an albuterol inh, current cannabis smoker presented to the ED earlier this morning with 3 days of asthma exacerbation, found to be parainfluenza+, CXR showing clear lungs no consolidation, left after receiving treatment and feeling better but then re-presented to ED same day for SOB again, decreased air movement in all lung fields, put on BIPAP.    Admitted to MICU for new BIPAP i/s/o severe asthma exacerbations.    === Neuro ===  - on precedex gtt for toleration of BIPAP    === Cardio ===  - ECG/QTc: no signs of ischemia  - no active issue    === Respiratory ===  # Severe asthma exacerbation likely triggered by viral infection  - VBG: pH 7.42, lactate 3.3, pCO2 37  - Parainfluenza +  - CXR showing clear lung, no consolidations  - s/p epi, multiple pushes of Mg, duonebs, albuterol in ED  - s/p steroids prednisone 40, decadron 10  - s/p ketamine for toleration of BIPAP  - on BIPAP w continuous albuterol  - decide in AM 8/16 for second day of steroids (rec decadron 12-16mg or other steroid for 1-2 days in acute asthma exac)  - if pt decompensates, will intubate with ketamine and epinephrine, otherwise trying to avoid intubation    === GI/nutrition ===  - Diet: NPO while on BIPAP  - no active issues     === ID ===  # Parainfluenza positive  - see respiratory above  - f/u BCx's    === /Renal ===  - no active issues    === Endocrine ===  - no active issues     === Heme ===  - no DVT ppx for now  - no active issues    === Ethics ===  - Code status: full. Pt requesting no intubation if possible.  - Lines: PIV

## 2023-08-15 NOTE — ED PROVIDER NOTE - PATIENT PORTAL LINK FT
You can access the FollowMyHealth Patient Portal offered by [facility] by registering at the following website: [email or web address]. By joining [facility]’s FollowMyHealth portal, you will also be able to view your health information using other applications (apps) compatible with our system.

## 2023-08-15 NOTE — ED PROVIDER NOTE - ATTENDING CONTRIBUTION TO CARE
Upon my evaluation, this patient had a high probability of imminent or life-threatening deterioration due to ASTHMA EXACERBATION which required my direct attention, intervention, and personal management.  The patient has a  medical condition that impairs one or more vital organ systems.  Frequent personal assessment and adjustment of medical interventions was performed.      I have personally provided 50 minutes of critical care time exclusive of time spent on separately billable procedures. Time includes review of laboratory data, radiology results, discussion with consultants, patient and family; monitoring for potential decompensation, as well as time spent retrieving data and reviewing the chart and documenting the visit. Interventions were performed as documented above.     HPI: Patient 62-year-old male history of asthma, smokes cannabis recreationally presents to the ED With 3 days of asthma exacerbation.  Patient reports he has been having cough, productive sputum, chills, and shortness of breath consistent with previous asthma exacerbations.  Has been taking Symbicort as prescribed at home daily, does not have a rescue albuterol inhaler.  Denies fever, chest pain, pleuritic pain, nausea, vomiting, diarrhea, abdominal pain.  EXAM: SOB, tachypneic, wheezing in all lung fields, contractions with breathing, no drooling, no stridor, abd soft nontender, heart Tachy.   MDM: pt with asthma, smokes THC only that has not had asthma exacerbation for many years that presents with what appears to be asthma exacerbation likely secondary to a viral illness giving cough and runny nose, chills. No known sick contacts, no travel, unlikely PE or ACS as no chest pain. Will obtain labs, RVP, CXR, provide meds and reassess.

## 2023-08-15 NOTE — ED PROVIDER NOTE - IV ALTEPLASE ADMIN OUTSIDE HIDDEN
Your Child's Health  FOUR-MONTH-OLD VISIT       Santos Andrew  December 28, 2023    There were no vitals taken for this visit.  Weight:     YOUR CHILD'S 4 MONTH-OLD VISIT      Key points at this age…    Car seat safety is critical! Make sure your baby is safely and properly riding in a rear-facing car seat.    Continue to provide a safe sleep environment for your baby. Items left in a crib can cause choking and suffocation.     Your baby will be moving around more soon. It is important to protect your baby from falling and from having access to harmful substances and objects.     NUTRITION: Breastmilk will still provide all the nutrition your baby needs at this age. If you are still breastfeeding, continue to give a vitamin D supplement. (If you are formula feeding, your baby will be getting all the needed vitamins in the formula.) Your baby should continue on breastmilk or formula until 12 months of age. Juice is not recommended for infants younger than 12 months of age.    Between now and 6 months, your baby will likely be ready to start some solid foods. When your baby has good head control, try a few spoonfuls of a baby food (infant cereals and pureed meats are good starter foods [especially for breastfeeding babies] because they have lots of iron). If Santos clearly swallows easily (rather than pushing the food back out of their mouth!), you can start offering foods regularly. Once it is clear your baby is tolerating their first foods well, start experimenting with other baby foods like fruits and vegetables. Try just one food at a time (so that it will be clear if your baby does not tolerate it). Do not give your baby any food that is solid or chunky at this age.     Do not give your baby honey at this age. (Honey should be avoided until age 12 months because of the risk of a rare infection.)     Watch your baby carefully when they are feeding. Do not “bottle prop” (because of the risk of choking) and do not  put cereal or other foods in the bottle.  Also, do not let your baby sleep with a bottle (this can lead to ear infections and tooth decay).     DEVELOPMENT:  Things start happening fast at this age! Your baby is likely to start rolling (from front to back and back to front), reaching and grabbing for things, and they will soon be putting everything in their mouth (so watch them carefully!). They will be doing lots of babbling and cooing and laughing. You will probably start to recognize that their cry is different according to why they are crying (hunger, fear, etc.).    Reading books is a great way to spend time with your baby. Even if it is only for a few minutes at a time, reading to infants has been scientifically proven to stimulate brain development, strengthen parent-child bonding and improve language and literacy skills long term. (Television, on the other hand, has not been shown to provide any benefits. So turn off the TV and read a book!)    PARENTING TIPS:  Parent health is just as important as infant health. Make sure you are taking care of yourself and enjoying your baby. It is normal to be tired and overwhelmed sometimes, but these feelings shouldn’t last for long. Post-partum depression can affect moms by making them feel unable to love their baby, hopeless about the future and not excited about caring for their baby. Talk to your doctor if you are feeling this way emotionally; it can be helped!    Remember never to yell or curse or hit Santos.  Don’t allow drugs, alcohol, or foul language around your children. (Babies can learn bad behavior quickly!) If someone in your home owns a firearm, make sure it is always stored safely: unloaded and locked in a gun safe with the ammunition stored separately.       Remember to NEVER, EVER shake your baby when you are frustrated.    DENTAL  Sharing spoons or cleaning off a pacifier in your mouth may increase your baby’s risk of cavities (even if they don’t have  teeth yet!) because it transfers bacteria to your baby’s mouth.       SAFETY:   1. Burns and Water Safety:  Prevent scald burns by adjusting your hot-water heater temperature to 120-125°F. Also, do not handle hot items (hot dishes, hot drinks, irons) while carrying your baby. Make sure smoke detectors and carbon monoxide detectors are installed and properly working. Also, never leave your child alone in a bathtub (or sink or pool), even for a moment.      2. Falls: Never leave Santos alone on a bed, couch, changing table or counter--if they are not already rolling, they will be soon! Always keep one hand on them when you are changing them. Whenever your baby is in any sort of seat (or carrier or swing), make sure they are properly strapped in. Also, your baby will be crawling soon. Start working on jhaveri for stairways and doorways to prevent falls and to keep them safe. Walkers are actually dangerous for babies (because of the risk of falling); they are not recommended at any age.     3. Harmful Household Items: Be aware that common items can be harmful to infants. Small things (marbles, coins, pieces of plastic, plastic bags, buttons, batteries, balloons) can cause choking or suffocation. Sharp objects (scissors, toys with sharp edges) should be kept out of reach. Never put anything (necklaces, strings, cords) around your baby’s neck because of the risk of strangulation. Button batteries are particularly dangerous to children: they can not only cause choking but they can cause internal tissue and organ damage that can be life-threatening. Be very aware of this risk when you are changing batteries in things like remote controls, garage door openers, cell phones, flameless candles, watches, cameras, and digital thermometers. Button batteries are also present in musical greeting cards (often very appealing to curious babies!) If you think your child has swallowed a button battery, they should be evaluated immediately.      4. Poisoning:  Medications and cleaning products must be kept out of reach (not just out of sight!). Remember your baby will soon be crawling and will be able to open cupboards which are not locked. Keep and use the original safety caps that come with medications; do not transfer medicines to non-child-proofed or unlabeled containers. Be especially careful when around older persons (either in their home or in yours) because they may be in the habit of keeping their medicines in open view or in non-childproofed pill containers.      Call the Poison Center at 1-390.180.9884 for any known or suspected poisoning. (Put this number in your cell phone so you have it when you need it!)    5. Car Safety:  An approved rear-facing car seat is required by [place] law for Santos. It should always be in the back-seat. If you are not sure the car seat is installed or adjusted correctly, a couple helpful web sites are www.safercar.gov and the [place] DOT website (search for “car seats”). Remember that the seat straps need to be very snug to protect your baby in case of an accident (so no thick coats or snowsuits while riding in the car during the winter!). Use your child's car seat even for short trips (most accidents occur close to home!) and don't forget to wear your own seat belt.      6. Safe Sleeping: Your baby should continue to sleep alone in their own bed, ideally in your room. Continue to put them to bed on their backs (“back to sleep”), but if they start rolling over on their own, you do not have to keep turning them over.  Continue to avoid loose, soft bedding (blankets, comforters, sheepskins, quilts, pillows, pillow-like bumper pads) and soft toys in the baby’s crib because they are a risk for suffocation (and SIDS). Safety criteria for cribs include: slats or bars no more than 2-3/8 inches (6cm) apart, a snug fitting mattress, and a distance of no less than 26 inches from the mattress to the top rail.    7. Sun  Exposure:  Your baby should not be spending time in the direct sun at this age. Sunscreens are ideally not recommended until after 6 months of age, so it safest to keep your baby in the shade or use sun-protective clothing (including hats and sunglasses!) when you are out. Small amounts of an infant sunscreen with an SPF of 30 or higher can be applied to exposed skin (faces, backs of hands) if your baby has to be outside for short periods. Look for products that contain only titanium dioxide or zinc oxide as the active ingredient (these are the safest and least irritating products for babies; avoid products that list other chemicals at this age).     8. Smoking: Do not let anyone smoke around your baby in the house OR in the car.   Exposure to cigarette smoke will increase your baby’s risk of SIDS (crib death), asthma, ear infections, and respiratory infections (pneumonia).    MEDICATION FOR FEVER OR PAIN:   Acetaminophen liquid (e.g., Tylenol or Tempra) may be given every four hours as needed for pain or fever.      INFANT/CHILDREN’S Tylenol/Acetaminophen  (160 MG/5 mL)    Child’s Weight: Dose:  06 - 11 pounds:   40 mg (1.25 mL  (1/4 Teaspoon))  12 - 17 pounds:   80 mg (2.5 mL  (1/2 Teaspoon))    If Santos is outside these weight ranges, call your pediatrician's office for advice.         Most Recent Immunizations   Administered Date(s) Administered   • DTaP/Hep B/IPV 2023   • Hep B, adolescent or pediatric 2023   • Hib (PRP-OMP) 2023   • Pneumococcal Conjugate 20 Valent Vacc (Prevnar 20) 2023   • Rotavirus - pentavalent 2023       If Santos develops any of the following reactions within 72 hours after an immunization, notify your pediatrician by calling the pediatric phone nurse:  1.   A temperature of 105 degrees or above.  2.   More than 3 hours of continuous crying.  3.   A shrill, high-pitched cry.  4.   A seizure or a fainting spell.  In this case, you should call 911 or go  immediately to the emergency room.        NEXT VISIT: SIX MONTHS OF AGE    Thank you for entrusting your care to [facility].    Also, check out “Children’s Health” on the [facility] Blog for updates on timely topics regarding children’s health!              show DISPLAY PLAN FREE TEXT

## 2023-08-15 NOTE — ED PROVIDER NOTE - CLINICAL SUMMARY MEDICAL DECISION MAKING FREE TEXT BOX
Dorita Duron DO PGY-3  62 year old male with PMH asthma, no history of hospitalizations/intubations, presents to the ED with3 days of chills, cough, sputum, shortness of breath c/w prior asthma attacks. Satting 100% on room air, diffuse wheezes B/L, hemodynamically stable nontoxic appearing. Likely asthma exacerbation will evaluate for precipitating viral syndrome/pneumonia. Plan for labs, CXR, RVP, screening EKG, and re-assess for dispo.

## 2023-08-15 NOTE — H&P ADULT - NSHPLABSRESULTS_GEN_ALL_CORE
< from: Xray Chest 2 Views PA/Lat (08.15.23 @ 07:14) >    FINDINGS:  The heart is normal in size.  The lungs are clear.  There is no pneumothorax or pleural effusion.  No acute bony abnormality.    IMPRESSION:  Clear lungs.    < end of copied text >

## 2023-08-15 NOTE — ED PROVIDER NOTE - CLINICAL SUMMARY MEDICAL DECISION MAKING FREE TEXT BOX
62-year-old male with past medical history of asthma coming in with shortness of breath.  Patient was seen here earlier in the morning and kept in CDU.  Patient tested positive for parainfluenza.  He received 4 Combi nebs, albuterol x1, magnesium 2 g, prednisone 40 mg.  Patient states that his symptoms were completely resolved when he left the department.  States he walked to his car about a 10 m to the car he started developing shortness of breath again.  No fevers, chills no chest pain at this time no abdominal pain, nausea, vomiting.  Patient is well-appearing.  No distress.  Abdomen soft nontender.  Decreased air entry in all fields with diffuse wheezing.  No pedal edema noted.  Differential diagnoses include but not limited to asthma exacerbation due to viral illness.  Patient had an x-ray that was negative for focal pneumonia.  Low concern for PE likely cause is asthma exacerbation–will obtain D-dimer

## 2023-08-15 NOTE — ED ADULT NURSE NOTE - OBJECTIVE STATEMENT
Pt received in rm #4, 62Y M aox4, ambulatory. pmhx asthma. pt c/o sob x2 days unrelieved Symbicort at home. Denies chest pain, n/v, fever, chills. Pt with wheezing MD Duron at bedside for eval. IV placed 20G Lft forearm, labs sent, medicated as ordered. pt on cardiac monitor and call bell within reach instructed to call for assistance as needed.

## 2023-08-15 NOTE — H&P ADULT - NSHPPHYSICALEXAM_GEN_ALL_CORE
HEENT: NC/AT. Sclera clear. Limited exam, on BIPAP.  Cardiac: Regular rhythm, tachycardic, +S1/S2  Chest: tachypneic, little to no air movement in all lung fields  Abdomen: Soft, nontender, nondistended. No guarding, no rebound tenderness.  Neuro: Alert and oriented x3. Motor strength and sensation grossly intact.    Extremities: No rashes, no bruising. No joint swelling. No edema.

## 2023-08-15 NOTE — H&P ADULT - NSHPREVIEWOFSYSTEMS_GEN_ALL_CORE
General: denies fever, chills, sweats  HEENT: denies headache, dizziness, changes in vision/blurriness, double vision, eye pain/discharge, changes in hearing, ear pain/discharge  Cardio: denies chest pain, leg pain/swelling  Resp: see HPI  GI: denies abdominal pain, nausea, vomiting, diarrhea, constipation  : denies dysuria, frequency, hematuria  Neuro: denies headache, dizziness, change in level of consciousness  Skin: denies rashes, pruritis

## 2023-08-15 NOTE — ED PROVIDER NOTE - PROGRESS NOTE DETAILS
RICK: I was signed out this pt pending MICU consult for first time bipap. Pt states he feels improvement still on bipap, s/p Epi, Mag, albuterol, ketamine. Will provide more mag, albuterol nebs and admit to MICU for close monitoring and treatment. Patient acutely worsened and started complaining of shortness of breath.  Patient was given 0.3 of epi, attempted to give multiple albuterol treatments patient was unable to tolerate a mask.  Patient was given 10 of Decadron and 2 g of magnesium.  EKG -no signs of ischemia.  Patient reported he only improved mildly.  Attempted to place the patient on BiPAP patient states he is unable to tolerate it.  Patient was given 20 of ketamine -patient started tolerating BiPAP.  MICU consulted, excepted the patient.  MICU advised to order another 2 g of magnesium.  Cleo Call,

## 2023-08-15 NOTE — ED PROVIDER NOTE - NSFOLLOWUPCLINICS_GEN_ALL_ED_FT
[facility] Pulmonolgy and Sleep Medicine  Pulmonology  [address] [place] [address], [facility] [address]  [place] [address]  Phone: [phone]  Fax:   Follow Up Time: 4-6 Days

## 2023-08-15 NOTE — ED PROVIDER NOTE - CARE PLAN
1 Principal Discharge DX:	SOB (shortness of breath)   Principal Discharge DX:	Acute asthma exacerbation

## 2023-08-15 NOTE — ED PROVIDER NOTE - ATTENDING CONTRIBUTION TO CARE
62-year-old male with past medical history of asthma coming in with shortness of breath.  Patient was seen here earlier in the morning and kept in CDU.  Patient tested positive for parainfluenza.  He received 4 Combi nebs, albuterol x1, magnesium 2 g, prednisone 40 mg.  Patient states that his symptoms were completely resolved when he left the department.  States he walked to his car about a 10 m to the car he started developing shortness of breath again.  No fevers, chills no chest pain at this time no abdominal pain, nausea, vomiting.  Patient is well-appearing.  No distress.  Abdomen soft nontender.  Decreased air entry in all fields with diffuse wheezing.  No pedal edema noted.  Differential diagnoses include but not limited to asthma exacerbation due to viral illness.  Patient had an x-ray that was negative for focal pneumonia.  Low concern for PE likely cause is asthma exacerbation–will obtain D-dimer 62-year-old male with past medical history of asthma coming in with shortness of breath.  Patient was seen here earlier in the morning and kept in CDU.  Patient tested positive for parainfluenza.  He received 4 Combi nebs, albuterol x1, magnesium 2 g, prednisone 40 mg.  Patient states that his symptoms were completely resolved when he left the department.  States he walked to his car about a 10 m to the car he started developing shortness of breath again.  No fevers, chills no chest pain at this time no abdominal pain, nausea, vomiting.  Patient is well-appearing.  No distress.  Abdomen soft nontender.  Decreased air entry in all fields with diffuse wheezing.  No pedal edema noted.  Differential diagnoses include but not limited to asthma exacerbation due to viral illness.  Patient had an x-ray that was negative for focal pneumonia.  Low concern for PE likely cause is asthma exacerbation–will obtain D-dimer    see prog notes    The patient's condition is critical. Management options were put in place to ensure further deterioration does not occur. In addition to the usual care provided, I have spent additional time with this patient through but not limited to the following: additional documentation  reviewing test results,  discussing with consultants,  discussing with patient / patient's family prognosis and course of care,  reassessment of patient's status and response to interventions.

## 2023-08-15 NOTE — ED ADULT NURSE NOTE - NSFALLASSESSNEED_ED_ALL_ED
All lines and monitors DC'd.  Discharge instructions given, questions answered.  Ambulated out of ER, escorted by RN.  Pt states all belongings in possession.  Instructed not to drive after pain medication and pt verbalizes understanding.  RX x0 given.      no

## 2023-08-15 NOTE — H&P ADULT - ATTENDING COMMENTS
pt is a 61 yo male with hx asthma , on symbicort, never intubated, last asthma  attack six years ago, presents with cough, productive sputum, shortness of breath.  In the ED noted to have rvp positive for parainfluenza, Pt with mild tachypnea,   placed on noninvasive ventilation. PE bp 130/74 rr 28  heent no jvd, lungs dec   bs b/l, heart s1s2 abdomen nontender ext no edema    labs and cxr reviewed , cxr hyperinflation no opacity or effusion    rvp pos parainfluenza  wbc 9 hgb 14 hct  44  ph 7.4    bipap 10/5   50%    A/p  61 yo male with status asthmaticus requiring bipap, nebulizer,  prednisone.   -would continue around the clock alb/atrovent neb  -bipap as tolerated  -f/u abg  -check echo to evaluate lv function  -dvt prophylaxis  -goals of care discussion

## 2023-08-15 NOTE — ED PROVIDER NOTE - OBJECTIVE STATEMENT
Patient 62-year-old male history of asthma, smokes cannabis recreationally presents to the ED With 3 days of asthma exacerbation.  Patient reports he has been having cough, productive sputum, chills, and shortness of breath consistent with previous asthma exacerbations.  Has been taking Symbicort as prescribed at home daily, does not have a rescue albuterol inhaler.  Denies fever, chest pain, pleuritic pain, nausea, vomiting, diarrhea, abdominal pain.

## 2023-08-15 NOTE — ED ADULT TRIAGE NOTE - CHIEF COMPLAINT QUOTE
pt with hx asthma d/cd from ED this am, c/o of increased sob, pt appears tachypneic, denies any chest pain

## 2023-08-15 NOTE — ED PROVIDER NOTE - CARE PLAN
1 Principal Discharge DX:	Asthma exacerbation   Principal Discharge DX:	Asthma exacerbation  Secondary Diagnosis:	Parainfluenza

## 2023-08-15 NOTE — ED ADULT NURSE NOTE - CHIEF COMPLAINT QUOTE
Pt with past history of asthma has a cold, cough and wheezing since Friday . He takes Symbicort but is wheezing is worse now. Denies fever at home. Offers no other complaints. audible wheezing noted

## 2023-08-15 NOTE — ED PROVIDER NOTE - PROGRESS NOTE DETAILS
RICK: pt labs shows + parainfluenza. Still wheezing despite 3 duonebs. CXR clear per my read. Will provide Magnesium and albuterol neb and continue to monitor, may require admission vs CDU. pt aware and amenable to plan. Dr. Marr: Pt was signed out to me awaiting re-eval after Mg. Pt noted to still having some expiratory wheezing but states feeling better. Offered OBS to continue treatment which pt agreed to. Dr. Marr: Pt was signed out to me awaiting re-eval after Mg. Pt noted to still having some expiratory wheezing but states feeling better. Sating 99% on RA. Offered OBS to continue treatment which pt agreed to. hCarla PGY 3 Patient wishes to now go home is feeling better and is no longer short of breath

## 2023-08-15 NOTE — H&P ADULT - HISTORY OF PRESENT ILLNESS
HPI:  Pt is a 61 yo M with PMHx of asthma, current cannabis smoker presented to the ED earlier this morning with 3 days of asthma exacerbation, c/o cough, chills, productive sputum, SOB consistent with previous asthma exacerbations. Pt has been taking his Symbicort as prescribed daily does not have a rescue albuterol inhaler. Denies fever, chest pain, pleuritic pain, nausea, vomiting, diarrhea, abd pain. Pt found to be parainfluenza+, CXR showing clear lungs, no focal consolidations. Pt received rounds of duonebs, given Mag and albuterol neb, prednisone 40mg, continued monitoring in CDU/observation in ED then left after feeling better. Pt then returned to the ED after walking 10min to the car and re-developing SOB.     ED course: Vitals /90, , RR 23, afebrile, satting 94% RA. Labs remarkable for K 3.4 (received albuterol in earlier ED visit), VBG: pH 7.42, lactate 3.3, pCO2 37. EKG showing no signs of ischemia. Pt placed on BIPAP, received duoneb, decadron 10, Mag 4g, albuterol neb, epi .3 IM felt better then acutely worsened.  Pt was then unable to tolerate bipap, given ketamine 20, started tolerating BIPAP.    MICU consulted for new BIPAP.

## 2023-08-15 NOTE — ED ADULT NURSE NOTE - OBJECTIVE STATEMENT
Pt received to spot 25. Pt is AxO 3 and ambulatory. Pt c/o of asthma exacerbation. pt was seen at [facility] ER earlier today, and was D/C. Once pt left he suffered another asthma exacerbation. Pt respirations are even and unlabored, with wheezing auscultated. Pt is sinus tachycardic on the tele monitor. Pt denies headaches, dizziness, N/V/D, chest pain, abdominal pain, or fever like symptoms. pt was medicated as prescribes. Pt has a 20G to the left arm. Pt labs sent to the lab. Plan of care on going. Safety maintained.

## 2023-08-16 LAB
ANION GAP SERPL CALC-SCNC: 13 MMOL/L — SIGNIFICANT CHANGE UP (ref 7–14)
APTT BLD: 31.7 SEC — SIGNIFICANT CHANGE UP (ref 24.5–35.6)
BASE EXCESS BLDV CALC-SCNC: -1.1 MMOL/L — SIGNIFICANT CHANGE UP (ref -2–3)
BASE EXCESS BLDV CALC-SCNC: 0.5 MMOL/L — SIGNIFICANT CHANGE UP (ref -2–3)
BASOPHILS # BLD AUTO: 0.01 K/UL — SIGNIFICANT CHANGE UP (ref 0–0.2)
BASOPHILS NFR BLD AUTO: 0.1 % — SIGNIFICANT CHANGE UP (ref 0–2)
BLD GP AB SCN SERPL QL: NEGATIVE — SIGNIFICANT CHANGE UP
BUN SERPL-MCNC: 12 MG/DL — SIGNIFICANT CHANGE UP (ref 7–23)
CALCIUM SERPL-MCNC: 9.1 MG/DL — SIGNIFICANT CHANGE UP (ref 8.4–10.5)
CHLORIDE SERPL-SCNC: 100 MMOL/L — SIGNIFICANT CHANGE UP (ref 98–107)
CO2 BLDV-SCNC: 24.7 MMOL/L — SIGNIFICANT CHANGE UP (ref 22–26)
CO2 BLDV-SCNC: 26.7 MMOL/L — HIGH (ref 22–26)
CO2 SERPL-SCNC: 23 MMOL/L — SIGNIFICANT CHANGE UP (ref 22–31)
CREAT SERPL-MCNC: 0.88 MG/DL — SIGNIFICANT CHANGE UP (ref 0.5–1.3)
EGFR: 97 ML/MIN/1.73M2 — SIGNIFICANT CHANGE UP
EOSINOPHIL # BLD AUTO: 0 K/UL — SIGNIFICANT CHANGE UP (ref 0–0.5)
EOSINOPHIL NFR BLD AUTO: 0 % — SIGNIFICANT CHANGE UP (ref 0–6)
GAS PNL BLDV: SIGNIFICANT CHANGE UP
GAS PNL BLDV: SIGNIFICANT CHANGE UP
GLUCOSE SERPL-MCNC: 158 MG/DL — HIGH (ref 70–99)
HCO3 BLDV-SCNC: 24 MMOL/L — SIGNIFICANT CHANGE UP (ref 22–29)
HCO3 BLDV-SCNC: 25 MMOL/L — SIGNIFICANT CHANGE UP (ref 22–29)
HCT VFR BLD CALC: 44.4 % — SIGNIFICANT CHANGE UP (ref 39–50)
HGB BLD-MCNC: 14.6 G/DL — SIGNIFICANT CHANGE UP (ref 13–17)
IANC: 8.83 K/UL — HIGH (ref 1.8–7.4)
IMM GRANULOCYTES NFR BLD AUTO: 0.3 % — SIGNIFICANT CHANGE UP (ref 0–0.9)
INR BLD: 1.02 RATIO — SIGNIFICANT CHANGE UP (ref 0.85–1.18)
LYMPHOCYTES # BLD AUTO: 0.48 K/UL — LOW (ref 1–3.3)
LYMPHOCYTES # BLD AUTO: 5 % — LOW (ref 13–44)
MAGNESIUM SERPL-MCNC: 3 MG/DL — HIGH (ref 1.6–2.6)
MCHC RBC-ENTMCNC: 29.9 PG — SIGNIFICANT CHANGE UP (ref 27–34)
MCHC RBC-ENTMCNC: 32.9 GM/DL — SIGNIFICANT CHANGE UP (ref 32–36)
MCV RBC AUTO: 90.8 FL — SIGNIFICANT CHANGE UP (ref 80–100)
MONOCYTES # BLD AUTO: 0.18 K/UL — SIGNIFICANT CHANGE UP (ref 0–0.9)
MONOCYTES NFR BLD AUTO: 1.9 % — LOW (ref 2–14)
NEUTROPHILS # BLD AUTO: 8.83 K/UL — HIGH (ref 1.8–7.4)
NEUTROPHILS NFR BLD AUTO: 92.7 % — HIGH (ref 43–77)
NRBC # BLD: 0 /100 WBCS — SIGNIFICANT CHANGE UP (ref 0–0)
NRBC # FLD: 0 K/UL — SIGNIFICANT CHANGE UP (ref 0–0)
PCO2 BLDV: 38 MMHG — LOW (ref 42–55)
PCO2 BLDV: 41 MMHG — LOW (ref 42–55)
PH BLDV: 7.4 — SIGNIFICANT CHANGE UP (ref 7.32–7.43)
PH BLDV: 7.4 — SIGNIFICANT CHANGE UP (ref 7.32–7.43)
PHOSPHATE SERPL-MCNC: 2.4 MG/DL — LOW (ref 2.5–4.5)
PLATELET # BLD AUTO: 229 K/UL — SIGNIFICANT CHANGE UP (ref 150–400)
PO2 BLDV: 101 MMHG — HIGH (ref 25–45)
PO2 BLDV: 213 MMHG — HIGH (ref 25–45)
POTASSIUM SERPL-MCNC: 3.9 MMOL/L — SIGNIFICANT CHANGE UP (ref 3.5–5.3)
POTASSIUM SERPL-SCNC: 3.9 MMOL/L — SIGNIFICANT CHANGE UP (ref 3.5–5.3)
PROTHROM AB SERPL-ACNC: 11.4 SEC — SIGNIFICANT CHANGE UP (ref 9.5–13)
RBC # BLD: 4.89 M/UL — SIGNIFICANT CHANGE UP (ref 4.2–5.8)
RBC # FLD: 13.5 % — SIGNIFICANT CHANGE UP (ref 10.3–14.5)
RH IG SCN BLD-IMP: POSITIVE — SIGNIFICANT CHANGE UP
SAO2 % BLDV: 99 % — HIGH (ref 67–88)
SAO2 % BLDV: 99.8 % — HIGH (ref 67–88)
SODIUM SERPL-SCNC: 136 MMOL/L — SIGNIFICANT CHANGE UP (ref 135–145)
TROPONIN T, HIGH SENSITIVITY RESULT: 14 NG/L — SIGNIFICANT CHANGE UP
WBC # BLD: 9.53 K/UL — SIGNIFICANT CHANGE UP (ref 3.8–10.5)
WBC # FLD AUTO: 9.53 K/UL — SIGNIFICANT CHANGE UP (ref 3.8–10.5)

## 2023-08-16 PROCEDURE — 99291 CRITICAL CARE FIRST HOUR: CPT | Mod: GC

## 2023-08-16 RX ORDER — DEXMEDETOMIDINE HYDROCHLORIDE IN 0.9% SODIUM CHLORIDE 4 UG/ML
0.2 INJECTION INTRAVENOUS
Qty: 400 | Refills: 0 | Status: DISCONTINUED | OUTPATIENT
Start: 2023-08-16 | End: 2023-08-17

## 2023-08-16 RX ORDER — ALBUTEROL 90 UG/1
4 AEROSOL, METERED ORAL
Refills: 0 | Status: DISCONTINUED | OUTPATIENT
Start: 2023-08-16 | End: 2023-08-17

## 2023-08-16 RX ORDER — ENOXAPARIN SODIUM 100 MG/ML
40 INJECTION SUBCUTANEOUS EVERY 24 HOURS
Refills: 0 | Status: DISCONTINUED | OUTPATIENT
Start: 2023-08-16 | End: 2023-08-16

## 2023-08-16 RX ORDER — ALBUTEROL 90 UG/1
2.5 AEROSOL, METERED ORAL
Refills: 0 | Status: DISCONTINUED | OUTPATIENT
Start: 2023-08-16 | End: 2023-08-16

## 2023-08-16 RX ORDER — DEXAMETHASONE 0.5 MG/5ML
12 ELIXIR ORAL DAILY
Refills: 0 | Status: DISCONTINUED | OUTPATIENT
Start: 2023-08-16 | End: 2023-08-16

## 2023-08-16 RX ORDER — ENOXAPARIN SODIUM 100 MG/ML
40 INJECTION SUBCUTANEOUS EVERY 24 HOURS
Refills: 0 | Status: DISCONTINUED | OUTPATIENT
Start: 2023-08-16 | End: 2023-08-20

## 2023-08-16 RX ORDER — ENOXAPARIN SODIUM 100 MG/ML
40 INJECTION SUBCUTANEOUS ONCE
Refills: 0 | Status: DISCONTINUED | OUTPATIENT
Start: 2023-08-16 | End: 2023-08-16

## 2023-08-16 RX ORDER — POTASSIUM PHOSPHATE, MONOBASIC POTASSIUM PHOSPHATE, DIBASIC 236; 224 MG/ML; MG/ML
30 INJECTION, SOLUTION INTRAVENOUS ONCE
Refills: 0 | Status: COMPLETED | OUTPATIENT
Start: 2023-08-16 | End: 2023-08-16

## 2023-08-16 RX ORDER — SODIUM CHLORIDE 9 MG/ML
1000 INJECTION, SOLUTION INTRAVENOUS
Refills: 0 | Status: DISCONTINUED | OUTPATIENT
Start: 2023-08-16 | End: 2023-08-18

## 2023-08-16 RX ADMIN — ALBUTEROL 4 PUFF(S): 90 AEROSOL, METERED ORAL at 22:30

## 2023-08-16 RX ADMIN — ALBUTEROL 4 PUFF(S): 90 AEROSOL, METERED ORAL at 20:08

## 2023-08-16 RX ADMIN — ALBUTEROL 2.5 MILLIGRAM(S): 90 AEROSOL, METERED ORAL at 13:54

## 2023-08-16 RX ADMIN — Medication 40 MILLIGRAM(S): at 20:01

## 2023-08-16 RX ADMIN — Medication 40 MILLIGRAM(S): at 08:48

## 2023-08-16 RX ADMIN — DEXMEDETOMIDINE HYDROCHLORIDE IN 0.9% SODIUM CHLORIDE 3.94 MICROGRAM(S)/KG/HR: 4 INJECTION INTRAVENOUS at 01:30

## 2023-08-16 RX ADMIN — ALBUTEROL 4 MG/HR: 90 AEROSOL, METERED ORAL at 04:02

## 2023-08-16 RX ADMIN — ALBUTEROL 2.5 MILLIGRAM(S): 90 AEROSOL, METERED ORAL at 15:54

## 2023-08-16 RX ADMIN — ENOXAPARIN SODIUM 40 MILLIGRAM(S): 100 INJECTION SUBCUTANEOUS at 08:48

## 2023-08-16 RX ADMIN — ALBUTEROL 2.5 MILLIGRAM(S): 90 AEROSOL, METERED ORAL at 11:04

## 2023-08-16 RX ADMIN — DEXMEDETOMIDINE HYDROCHLORIDE IN 0.9% SODIUM CHLORIDE 3.94 MICROGRAM(S)/KG/HR: 4 INJECTION INTRAVENOUS at 08:36

## 2023-08-16 RX ADMIN — CHLORHEXIDINE GLUCONATE 1 APPLICATION(S): 213 SOLUTION TOPICAL at 06:13

## 2023-08-16 RX ADMIN — ALBUTEROL 4 MG/HR: 90 AEROSOL, METERED ORAL at 01:26

## 2023-08-16 RX ADMIN — SODIUM CHLORIDE 75 MILLILITER(S): 9 INJECTION, SOLUTION INTRAVENOUS at 10:40

## 2023-08-16 RX ADMIN — POTASSIUM PHOSPHATE, MONOBASIC POTASSIUM PHOSPHATE, DIBASIC 83.33 MILLIMOLE(S): 236; 224 INJECTION, SOLUTION INTRAVENOUS at 10:40

## 2023-08-16 NOTE — ED ADULT NURSE REASSESSMENT NOTE - NS ED NURSE REASSESS COMMENT FT1
[facility] RN: pt received in rm 25 in ED, BIPAP being placed for respiratory distress. Pt alert and oriented. Pt showed improvement on BIPAP, MICU team at bedside accepted pt for MICU admission. Report given to MICU, pt transported to MICU with RT accompanying without incident.

## 2023-08-16 NOTE — CHART NOTE - NSCHARTNOTEFT_GEN_A_CORE
MICU Transfer Note    Transfer from: MICU    Transfer to: (X) Medicine    ( ) Telemetry     ( ) RCU        ( ) Palliative         ( ) Stroke Unit          ( ) __________________    Accepting physician:    Pt is a 63 yo M with PMHx of asthma, current cannabis smoker presented to the ED earlier this morning with 3 days of asthma exacerbation, c/o cough, chills, productive sputum, SOB consistent with previous asthma exacerbations. Pt has been taking his Symbicort as prescribed daily does not have a rescue albuterol inhaler. Denies fever, chest pain, pleuritic pain, nausea, vomiting, diarrhea, abd pain. Pt found to be parainfluenza+, CXR showing clear lungs, no focal consolidations. Pt received rounds of duonebs, given Mag and albuterol neb, prednisone 40mg, continued monitoring in CDU/observation in ED then left after feeling better. Pt then returned to the ED after walking 10min to the car and re-developing SOB.     ED course: Vitals /90, , RR 23, afebrile, satting 94% RA. Labs remarkable for K 3.4 (received albuterol in earlier ED visit), VBG: pH 7.42, lactate 3.3, pCO2 37. EKG showing no signs of ischemia. Pt placed on BIPAP, received duoneb, decadron 10, Mag 4g, albuterol neb, epi .3 IM felt better then acutely worsened.  Pt was then unable to tolerate bipap, given ketamine 20, started tolerating BIPAP.    MICU consulted for new BIPAP    MICU COURSE:          ASSESSMENT & PLAN:     For Followup:        Vitals  T(F): 98.1 (08-16-23 @ 12:00), Max: 98.2 (08-16-23 @ 08:00)  HR: 101 (08-16-23 @ 14:00) (100 - 140)  BP: 120/68 (08-16-23 @ 14:00) (98/58 - 187/86)  BP(mean): 77 (08-16-23 @ 14:00) (57 - 132)  ABP: --  ABP(mean): --  RR: 20 (08-16-23 @ 14:00) (18 - 34)  SpO2: 99% (08-16-23 @ 14:00) (94% - 100%)  I/O Summary 24H    IN: 12 mL / OUT: 400 mL / NET: -388 mL        MEDICATIONS  (STANDING):  albuterol    0.083% 2.5 milliGRAM(s) Nebulizer every 2 hours  chlorhexidine 2% Cloths 1 Application(s) Topical <User Schedule>  dexMEDEtomidine Infusion 0.2 MICROgram(s)/kG/Hr (3.94 mL/Hr) IV Continuous <Continuous>  dextrose 5%. 1000 milliLiter(s) (75 mL/Hr) IV Continuous <Continuous>  enoxaparin Injectable 40 milliGRAM(s) SubCutaneous every 24 hours  methylPREDNISolone sodium succinate Injectable 40 milliGRAM(s) IV Push two times a day    MEDICATIONS  (PRN):        LABS  CBC 08-16-23 @ 01:10                        14.6   9.53  )-----------( 229                   44.4       Hgb trend: 14.6 <-- , 14.1 <-- , 13.8 <--   WBC trend: 9.53 <-- , 8.09 <-- , 5.98 <--     CMP 08-16-23 @ 01:10    136  |  100  |  12  ----------------------------<  158<H>  3.9   |  23  |  0.88    Ca    9.1      08-16-23 @ 01:10  Phos  2.4     08-16  Mg     3.00     08-16    TPro  7.7  /  Alb  4.8  /  TBili  0.5  /  DBili  x   /  AST  22  /  ALT  19  /  AlkPhos  72  08-15      Serum Cr trend: 0.88 <-- , 1.00 <-- , 0.94 <--   PT/INR - ( 16 Aug 2023 01:20 )   PT: 11.4 sec;   INR: 1.02 ratio         PTT - ( 16 Aug 2023 01:20 ):31.7 sec MICU Transfer Note    Transfer from: MICU    Transfer to: (X) Medicine    ( ) Telemetry     ( ) RCU        ( ) Palliative         ( ) Stroke Unit          ( ) __________________    Accepting physician:    Pt is a 63 yo M with PMHx of asthma, current cannabis smoker presented to the ED earlier this morning with 3 days of asthma exacerbation, c/o cough, chills, productive sputum, SOB consistent with previous asthma exacerbations. Denied fever, chest pain, pleuritic pain, nausea, vomiting, diarrhea, abd pain. Pt found to be parainfluenza+, CXR showing clear lungs, no focal consolidations. Pt received a rounds of duonebs, given Mag and albuterol neb, prednisone 40mg, continued monitoring in CDU/observation in ED then left after feeling better. Pt then returned to the ED after walking 10min to the car and re-developing SOB.     ED course: Vitals /90, , RR 23, afebrile, satting 94% RA. Labs remarkable for K 3.4 (received albuterol in earlier ED visit), VBG: pH 7.42, lactate 3.3, pCO2 37. EKG showing no signs of ischemia. Pt placed on BIPAP, received duoneb, decadron 10, Mag 4g, albuterol neb, epi .3 IM felt better then acutely worsened.  Pt was then unable to tolerate bipap, given ketamine 20, started tolerating BIPAP.    MICU consulted for new BIPAP.    MICU COURSE:  Patient arrived to MICU on bipap with settings of IPAP: 14, EPAP: 5, and FiO2: 30. He was started on methylprednisone 40mg q12hrs and albuterol nebs q2hrs, with plans to taper his methylprednisone starting 8/17. His respiratory status improved and he was able to be transitioned from Bipap to nasal cannula at 4L. Patient was saturating in high 90's on 4L NC, and was monitored for a few hours after being transitioned with no changes in respiratory status. Patient medically cleared for step-down to medical floors, without any acute MICU needs.      ASSESSMENT & PLAN:   Pt is a 63 yo M with PMHx of asthma, current cannabis smoker presented to the ED with 3 days of asthma exacerbation, found to be parainfluenza+, CXR showing clear lungs no consolidation, eventually requiring BIPAP and admitted to MICU.    #Neuro  - Precedex gtt for toleration of BIPAP    #Skin  - No active issues    #GI  - Diet: NPO while on BiPAP, eligible for oral intake when RR stable < 30    #Metabolic  - No active issues    #Volume/  - No active issues  - Started D5 75 cc/h    #Heme/onc  - Lovenox 40 for DVT ppx    #Cardio  - EKG wnl  - No active issues    #Pulm  - Severe asthma exacerbation likely 2/2 viral infection  - s/p epi, multiple Mg push, Duonebs, albuterol in ED  - s/p steroids pred 40, deca 10  - s/p Precedex for BIPAP toleration  - Methylpred 40 bid today, then taper to 40 qd on tomorrow (8/17)  - Switch continuous albuterol to albuterol q2 via NC without ipratropium    #Ethics  Code status: full.   Pt requests no intubation if possible.  Lines: PIV    For Followup:  [ ] Taper methylprednisone  [ ] Continue albuterol  [ ] Wean pt off NC      Vitals  T(F): 98.1 (08-16-23 @ 12:00), Max: 98.2 (08-16-23 @ 08:00)  HR: 101 (08-16-23 @ 14:00) (100 - 140)  BP: 120/68 (08-16-23 @ 14:00) (98/58 - 187/86)  BP(mean): 77 (08-16-23 @ 14:00) (57 - 132)  ABP: --  ABP(mean): --  RR: 20 (08-16-23 @ 14:00) (18 - 34)  SpO2: 99% (08-16-23 @ 14:00) (94% - 100%)  I/O Summary 24H    IN: 12 mL / OUT: 400 mL / NET: -388 mL        MEDICATIONS  (STANDING):  albuterol    0.083% 2.5 milliGRAM(s) Nebulizer every 2 hours  chlorhexidine 2% Cloths 1 Application(s) Topical <User Schedule>  dexMEDEtomidine Infusion 0.2 MICROgram(s)/kG/Hr (3.94 mL/Hr) IV Continuous <Continuous>  dextrose 5%. 1000 milliLiter(s) (75 mL/Hr) IV Continuous <Continuous>  enoxaparin Injectable 40 milliGRAM(s) SubCutaneous every 24 hours  methylPREDNISolone sodium succinate Injectable 40 milliGRAM(s) IV Push two times a day    MEDICATIONS  (PRN):        LABS  CBC 08-16-23 @ 01:10                        14.6   9.53  )-----------( 229                   44.4       Hgb trend: 14.6 <-- , 14.1 <-- , 13.8 <--   WBC trend: 9.53 <-- , 8.09 <-- , 5.98 <--     CMP 08-16-23 @ 01:10    136  |  100  |  12  ----------------------------<  158<H>  3.9   |  23  |  0.88    Ca    9.1      08-16-23 @ 01:10  Phos  2.4     08-16  Mg     3.00     08-16    TPro  7.7  /  Alb  4.8  /  TBili  0.5  /  DBili  x   /  AST  22  /  ALT  19  /  AlkPhos  72  08-15      Serum Cr trend: 0.88 <-- , 1.00 <-- , 0.94 <--   PT/INR - ( 16 Aug 2023 01:20 )   PT: 11.4 sec;   INR: 1.02 ratio         PTT - ( 16 Aug 2023 01:20 ):31.7 sec MICU Transfer Note    Transfer from: MICU    Transfer to: (X) Medicine    ( ) Telemetry     ( ) RCU        ( ) Palliative         ( ) Stroke Unit          ( ) __________________    Accepting physician:    Pt is a 63 yo M with PMHx of asthma, current cannabis smoker presented to the ED earlier this morning with 3 days of asthma exacerbation, c/o cough, chills, productive sputum, SOB consistent with previous asthma exacerbations. Denied fever, chest pain, pleuritic pain, nausea, vomiting, diarrhea, abd pain. Pt found to be parainfluenza+, CXR showing clear lungs, no focal consolidations. Pt received a rounds of duonebs, given Mag and albuterol neb, prednisone 40mg, continued monitoring in CDU/observation in ED then left after feeling better. Pt then returned to the ED after walking 10min to the car and re-developing SOB.     ED course: Vitals /90, , RR 23, afebrile, satting 94% RA. Labs remarkable for K 3.4 (received albuterol in earlier ED visit), VBG: pH 7.42, lactate 3.3, pCO2 37. EKG showing no signs of ischemia. Pt placed on BIPAP, received duoneb, decadron 10, Mag 4g, albuterol neb, epi .3 IM felt better then acutely worsened.  Pt was then unable to tolerate bipap, given ketamine 20, started tolerating BIPAP.    MICU consulted for new BIPAP.    MICU COURSE:  Patient arrived to MICU on bipap. He was started on methylprednisone 40mg q12hrs and albuterol nebs q2hrs, with plans to taper his methylprednisone starting 8/17. His respiratory status improved and he was able to be transitioned from Bipap to nasal cannula at 4L. Patient was saturating in high 90's on 4L NC, and was monitored for a few hours after being transitioned with no changes in respiratory status. Patient medically cleared for step-down to medical floors, without any acute MICU needs.      ASSESSMENT & PLAN:   Pt is a 63 yo M with PMHx of asthma, current cannabis smoker presented to the ED with 3 days of asthma exacerbation, found to be parainfluenza+, CXR showing clear lungs no consolidation, eventually requiring BIPAP and admitted to MICU.    #Neuro  - Precedex gtt for toleration of BIPAP    #Skin  - No active issues    #GI  - Diet: NPO while on BiPAP, eligible for oral intake when RR stable < 30    #Metabolic  - No active issues    #Volume/  - No active issues  - Started D5 75 cc/h    #Heme/onc  - Lovenox 40 for DVT ppx    #Cardio  - EKG wnl  - No active issues    #Pulm  - Severe asthma exacerbation likely 2/2 viral infection  - s/p epi, multiple Mg push, Duonebs, albuterol in ED  - s/p steroids pred 40, deca 10  - s/p Precedex for BIPAP toleration  - Methylpred 40 bid today, then taper to 40 qd on tomorrow (8/17)  - Switch continuous albuterol to albuterol q2 via NC without ipratropium    #Ethics  Code status: full.   Pt requests no intubation if possible.  Lines: PIV    For Followup:  [ ] Taper methylprednisone  [ ] Continue albuterol  [ ] Wean pt off NC      Vitals  T(F): 98.1 (08-16-23 @ 12:00), Max: 98.2 (08-16-23 @ 08:00)  HR: 101 (08-16-23 @ 14:00) (100 - 140)  BP: 120/68 (08-16-23 @ 14:00) (98/58 - 187/86)  BP(mean): 77 (08-16-23 @ 14:00) (57 - 132)  ABP: --  ABP(mean): --  RR: 20 (08-16-23 @ 14:00) (18 - 34)  SpO2: 99% (08-16-23 @ 14:00) (94% - 100%)  I/O Summary 24H    IN: 12 mL / OUT: 400 mL / NET: -388 mL        MEDICATIONS  (STANDING):  albuterol    0.083% 2.5 milliGRAM(s) Nebulizer every 2 hours  chlorhexidine 2% Cloths 1 Application(s) Topical <User Schedule>  dexMEDEtomidine Infusion 0.2 MICROgram(s)/kG/Hr (3.94 mL/Hr) IV Continuous <Continuous>  dextrose 5%. 1000 milliLiter(s) (75 mL/Hr) IV Continuous <Continuous>  enoxaparin Injectable 40 milliGRAM(s) SubCutaneous every 24 hours  methylPREDNISolone sodium succinate Injectable 40 milliGRAM(s) IV Push two times a day    MEDICATIONS  (PRN):        LABS  CBC 08-16-23 @ 01:10                        14.6   9.53  )-----------( 229                   44.4       Hgb trend: 14.6 <-- , 14.1 <-- , 13.8 <--   WBC trend: 9.53 <-- , 8.09 <-- , 5.98 <--     CMP 08-16-23 @ 01:10    136  |  100  |  12  ----------------------------<  158<H>  3.9   |  23  |  0.88    Ca    9.1      08-16-23 @ 01:10  Phos  2.4     08-16  Mg     3.00     08-16    TPro  7.7  /  Alb  4.8  /  TBili  0.5  /  DBili  x   /  AST  22  /  ALT  19  /  AlkPhos  72  08-15      Serum Cr trend: 0.88 <-- , 1.00 <-- , 0.94 <--   PT/INR - ( 16 Aug 2023 01:20 )   PT: 11.4 sec;   INR: 1.02 ratio         PTT - ( 16 Aug 2023 01:20 ):31.7 sec MICU Transfer Note    Transfer from: MICU    Transfer to: (X) Medicine    ( ) Telemetry     ( ) RCU        ( ) Palliative         ( ) Stroke Unit          ( ) __________________    Accepting physician:    Pt is a 63 yo M with PMHx of asthma, current cannabis smoker presented to the ED earlier this morning with 3 days of asthma exacerbation, c/o cough, chills, productive sputum, SOB consistent with previous asthma exacerbations. Denied fever, chest pain, pleuritic pain, nausea, vomiting, diarrhea, abd pain. Pt found to be parainfluenza+, CXR showing clear lungs, no focal consolidations. Pt received a rounds of duonebs, given Mag and albuterol neb, prednisone 40mg, continued monitoring in CDU/observation in ED then left after feeling better. Pt then returned to the ED after walking 10min to the car and re-developing SOB.     ED course: Vitals /90, , RR 23, afebrile, satting 94% RA. Labs remarkable for K 3.4 (received albuterol in earlier ED visit), VBG: pH 7.42, lactate 3.3, pCO2 37. EKG showing no signs of ischemia. Pt placed on BIPAP, received duoneb, decadron 10, Mag 4g, albuterol neb, epi .3 IM felt better then acutely worsened.  Pt was then unable to tolerate bipap, given ketamine 20, started tolerating BIPAP.    MICU consulted for new BIPAP.    MICU COURSE:  Patient arrived to MICU on bipap. He was started on methylprednisone 40mg q12hrs and albuterol nebs q2hrs, with plans to taper his methylprednisone starting 8/17. His respiratory status improved and he was able to be transitioned from Bipap to nasal cannula at 4L. Patient was saturating in high 90's on 4L NC, and was monitored for a few hours after being transitioned with no changes in respiratory status. Patient medically cleared for step-down to medical floors, without any acute MICU needs.      ASSESSMENT & PLAN:   Pt is a 63 yo M with PMHx of asthma, current cannabis smoker presented to the ED with 3 days of asthma exacerbation, found to be parainfluenza+, CXR showing clear lungs no consolidation, eventually requiring BIPAP and admitted to MICU.    #Neuro  - Precedex gtt for toleration of BIPAP    #Skin  - No active issues    #GI  - Diet: NPO while on BiPAP, eligible for oral intake when RR stable < 30    #Metabolic  - No active issues    #Volume/  - No active issues  - Started D5 75 cc/h    #Heme/onc  - Lovenox 40 for DVT ppx    #Cardio  - EKG wnl  - No active issues    #Pulm  - Severe asthma exacerbation likely 2/2 viral infection  - s/p epi, multiple Mg push, Duonebs, albuterol in ED  - s/p steroids pred 40, deca 10  - s/p Precedex for BIPAP toleration  - Methylpred 40 bid today, then taper to 40 qd on tomorrow (8/17)  - Switch continuous albuterol to albuterol q2 via NC without ipratropium    #Ethics  Code status: full.   Pt requests no intubation if possible.  Lines: PIV    For Followup:  [ ] Taper methylprednisone  [ ] Continue albuterol  [ ] Wean pt off NC      Vitals  T(F): 98.1 (08-16-23 @ 12:00), Max: 98.2 (08-16-23 @ 08:00)  HR: 101 (08-16-23 @ 14:00) (100 - 140)  BP: 120/68 (08-16-23 @ 14:00) (98/58 - 187/86)  BP(mean): 77 (08-16-23 @ 14:00) (57 - 132)  ABP: --  ABP(mean): --  RR: 20 (08-16-23 @ 14:00) (18 - 34)  SpO2: 99% (08-16-23 @ 14:00) (94% - 100%)  I/O Summary 24H    IN: 12 mL / OUT: 400 mL / NET: -388 mL        MEDICATIONS  (STANDING):  albuterol    0.083% 2.5 milliGRAM(s) Nebulizer every 2 hours  chlorhexidine 2% Cloths 1 Application(s) Topical <User Schedule>  dexMEDEtomidine Infusion 0.2 MICROgram(s)/kG/Hr (3.94 mL/Hr) IV Continuous <Continuous>  dextrose 5%. 1000 milliLiter(s) (75 mL/Hr) IV Continuous <Continuous>  enoxaparin Injectable 40 milliGRAM(s) SubCutaneous every 24 hours  methylPREDNISolone sodium succinate Injectable 40 milliGRAM(s) IV Push two times a day    MEDICATIONS  (PRN):        LABS  CBC 08-16-23 @ 01:10                        14.6   9.53  )-----------( 229                   44.4       Hgb trend: 14.6 <-- , 14.1 <-- , 13.8 <--   WBC trend: 9.53 <-- , 8.09 <-- , 5.98 <--     CMP 08-16-23 @ 01:10    136  |  100  |  12  ----------------------------<  158<H>  3.9   |  23  |  0.88    Ca    9.1      08-16-23 @ 01:10  Phos  2.4     08-16  Mg     3.00     08-16    TPro  7.7  /  Alb  4.8  /  TBili  0.5  /  DBili  x   /  AST  22  /  ALT  19  /  AlkPhos  72  08-15      Serum Cr trend: 0.88 <-- , 1.00 <-- , 0.94 <--   PT/INR - ( 16 Aug 2023 01:20 )   PT: 11.4 sec;   INR: 1.02 ratio         PTT - ( 16 Aug 2023 01:20 ):31.7 sec    Patient examined and case reviewed in detail on bedside rounds  Pt is safe for transfer out of MICU  Fidencio SIMON

## 2023-08-16 NOTE — PROGRESS NOTE ADULT - ASSESSMENT
Pt is a 63 yo M with PMHx of asthma on qd Symbicort does not have an albuterol inh, current cannabis smoker presented to the ED earlier this morning with 3 days of asthma exacerbation, found to be parainfluenza+, CXR showing clear lungs no consolidation, left after receiving treatment and feeling better but then re-presented to ED same day for SOB again, decreased air movement in all lung fields, put on BIPAP tolerated with Precedex.    #Neuro  - Precedex gtt for toleration of BIPAP    #Skin  - No active issues    #GI  - Diet: NPO while on BiPAP, eligible for oral intake when RR stable < 30    #Metabolic  - No active issues    #Volume/  - No active issues  - Started D5 75 cc/h    #Heme/onc  - Lovenox 40     #Cardio  - EKG wnl  - No active issues    #Pulm  - Severe asthma exacerbation likely 2/2 viral infection  - s/p epi, multiple Mg push, Duonebs, albuterol in ED  - s/p steroids pred 40, deca 10  - s/p Precedex for BIPAP toleration           Pt is a 63 yo M with PMHx of asthma on qd Symbicort does not have an albuterol inh, current cannabis smoker presented to the ED earlier this morning with 3 days of asthma exacerbation, found to be parainfluenza+, CXR showing clear lungs no consolidation, left after receiving treatment and feeling better but then re-presented to ED same day for SOB again, decreased air movement in all lung fields, put on BIPAP tolerated with Precedex.    #Neuro  - Precedex gtt for toleration of BIPAP    #Skin  - No active issues    #GI  - Diet: NPO while on BiPAP, eligible for oral intake when RR stable < 30    #Metabolic  - No active issues    #Volume/  - No active issues  - Started D5 75 cc/h    #Heme/onc  - Lovenox 40     #Cardio  - EKG wnl  - No active issues    #Pulm  - Severe asthma exacerbation likely 2/2 viral infection  - s/p epi, multiple Mg push, Duonebs, albuterol in ED  - s/p steroids pred 40, deca 10  - s/p Precedex for BIPAP toleration  - Methylpred 40 bid today, then taper to 40 qd on tomorrow (8/17)  - Switch continuous albuterol to albuterol q2 via NC without ipratropium    #Ethics  Code status: full.   Pt requests no intubation if possible.  Lines: PIV

## 2023-08-16 NOTE — CHART NOTE - NSCHARTNOTEFT_GEN_A_CORE
: Shirlene/Rafael/Fidencio    INDICATION:    PROCEDURE:  [x] LIMITED ECHO  [x] LIMITED CHEST  [ ] LIMITED RETROPERITONEAL  [ ] LIMITED ABDOMINAL  [ ] LIMITED DVT  [ ] NEEDLE GUIDANCE VASCULAR  [ ] NEEDLE GUIDANCE THORACENTESIS  [ ] NEEDLE GUIDANCE PARACENTESIS  [ ] NEEDLE GUIDANCE PERICARDIOCENTESIS  [ ] OTHER    FINDINGS:    Chest: Scattered B lines on bilateral lung fields, however A line predominant. No evidence of pleural effusion.    Cardiac: Adequate LV function. IVC compressible with respiratory variation.     INTERPRETATION: Findings consistent normal aeration pattern and adequate cardiac function. : Michael/Fidencio    INDICATION: Respiratory Failure i/s/o Asthma Exacerbation     PROCEDURE:  [x] LIMITED ECHO  [x] LIMITED CHEST  [ ] LIMITED RETROPERITONEAL  [ ] LIMITED ABDOMINAL  [ ] LIMITED DVT  [ ] NEEDLE GUIDANCE VASCULAR  [ ] NEEDLE GUIDANCE THORACENTESIS  [ ] NEEDLE GUIDANCE PARACENTESIS  [ ] NEEDLE GUIDANCE PERICARDIOCENTESIS  [ ] OTHER    FINDINGS:    Chest: Scattered B lines on bilateral lung fields, however A line predominant. No evidence of pleural effusion.    Cardiac: Adequate LV function. IVC compressible with respiratory variation.     INTERPRETATION: Findings consistent normal aeration pattern and adequate cardiac function. : Michael/Fidencio    INDICATION: Respiratory Failure i/s/o Asthma Exacerbation     PROCEDURE:  [x] LIMITED ECHO  [x] LIMITED CHEST  [ ] LIMITED RETROPERITONEAL  [ ] LIMITED ABDOMINAL  [ ] LIMITED DVT  [ ] NEEDLE GUIDANCE VASCULAR  [ ] NEEDLE GUIDANCE THORACENTESIS  [ ] NEEDLE GUIDANCE PARACENTESIS  [ ] NEEDLE GUIDANCE PERICARDIOCENTESIS  [ ] OTHER    FINDINGS:    Chest: Scattered B lines on bilateral lung fields, however A line predominant. No evidence of pleural effusion.    Cardiac: Adequate LV function. IVC compressible with respiratory variation.     INTERPRETATION: Findings consistent normal aeration pattern No cardiac limitation    I was present during the key portions of the procedure and immediately available during the entire procedure.  Fidencio SIMON  Attending

## 2023-08-16 NOTE — PROGRESS NOTE ADULT - SUBJECTIVE AND OBJECTIVE BOX
INTERVAL HPI/OVERNIGHT EVENTS:    SUBJECTIVE: Patient seen and examined at bedside.     ROS: All negative except as listed above.    VITAL SIGNS:  ICU Vital Signs Last 24 Hrs  T(C): 36.1 (16 Aug 2023 04:00), Max: 37.2 (15 Aug 2023 08:43)  T(F): 97 (16 Aug 2023 04:00), Max: 98.9 (15 Aug 2023 08:43)  HR: 129 (16 Aug 2023 06:00) (104 - 140)  BP: 134/76 (16 Aug 2023 06:00) (106/71 - 187/86)  BP(mean): 86 (16 Aug 2023 06:00) (80 - 132)  ABP: --  ABP(mean): --  RR: 22 (16 Aug 2023 06:00) (19 - 34)  SpO2: 98% (16 Aug 2023 06:00) (94% - 100%)    O2 Parameters below as of 16 Aug 2023 06:00  Patient On (Oxygen Delivery Method): BiPAP/CPAP  O2 Flow (L/min): 40          Plateau pressure:   P/F ratio:     08-15 @ 07:01  -  08-16 @ 07:00  --------------------------------------------------------  IN: 12 mL / OUT: 400 mL / NET: -388 mL      CAPILLARY BLOOD GLUCOSE          ECG: reviewed.    PHYSICAL EXAM:    GENERAL: NAD, lying in bed comfortably  HEAD:  Atraumatic, normocephalic  EYES: EOMI, PERRLA, conjunctiva and sclera clear  NECK: Supple, trachea midline, no JVD  HEART: Regular rate and rhythm, no murmurs, rubs, or gallops  LUNGS: Unlabored respirations.  Clear to auscultation bilaterally, no crackles, wheezing, or rhonchi  ABDOMEN: Soft, nontender, nondistended, +BS  EXTREMITIES: 2+ peripheral pulses bilaterally, cap refill<2 secs. No clubbing, cyanosis, or edema  NERVOUS SYSTEM:  A&Ox3, following commands, moving all extremities, no focal deficits   SKIN: No rashes or lesions    MEDICATIONS:  MEDICATIONS  (STANDING):  albuterol Continuous Nebulization (Vibrating Mesh Nebulizer) 10 mG/Hr (4 mL/Hr) Continuous Inhalation. <Continuous>  chlorhexidine 2% Cloths 1 Application(s) Topical <User Schedule>  dexMEDEtomidine Infusion 0.2 MICROgram(s)/kG/Hr (3.94 mL/Hr) IV Continuous <Continuous>    MEDICATIONS  (PRN):      ALLERGIES:  Allergies    No Known Allergies    Intolerances        LABS:                        14.6   9.53  )-----------( 229      ( 16 Aug 2023 01:10 )             44.4     08-16    136  |  100  |  12  ----------------------------<  158<H>  3.9   |  23  |  0.88    Ca    9.1      16 Aug 2023 01:10  Phos  2.4     08-16  Mg     3.00     08-16    TPro  7.7  /  Alb  4.8  /  TBili  0.5  /  DBili  x   /  AST  22  /  ALT  19  /  AlkPhos  72  08-15    PT/INR - ( 16 Aug 2023 01:20 )   PT: 11.4 sec;   INR: 1.02 ratio         PTT - ( 16 Aug 2023 01:20 )  PTT:31.7 sec  Urinalysis Basic - ( 16 Aug 2023 01:10 )    Color: x / Appearance: x / SG: x / pH: x  Gluc: 158 mg/dL / Ketone: x  / Bili: x / Urobili: x   Blood: x / Protein: x / Nitrite: x   Leuk Esterase: x / RBC: x / WBC x   Sq Epi: x / Non Sq Epi: x / Bacteria: x      ABG:      vBG:  pH, Venous: 7.40 (08-16-23 @ 06:10)  pCO2, Venous: 38 mmHg (08-16-23 @ 06:10)  pO2, Venous: 101 mmHg (08-16-23 @ 06:10)  HCO3, Venous: 24 mmol/L (08-16-23 @ 06:10)  pH, Venous: 7.40 (08-16-23 @ 01:10)  pCO2, Venous: 41 mmHg (08-16-23 @ 01:10)  pO2, Venous: 213 mmHg (08-16-23 @ 01:10)  HCO3, Venous: 25 mmol/L (08-16-23 @ 01:10)  pH, Venous: 7.31 (08-15-23 @ 22:25)  pCO2, Venous: 41 mmHg (08-15-23 @ 22:25)  pO2, Venous: 119 mmHg (08-15-23 @ 22:25)  HCO3, Venous: 21 mmol/L (08-15-23 @ 22:25)  pH, Venous: 7.42 (08-15-23 @ 19:19)  pCO2, Venous: 37 mmHg (08-15-23 @ 19:19)  pO2, Venous: 71 mmHg (08-15-23 @ 19:19)  HCO3, Venous: 24 mmol/L (08-15-23 @ 19:19)    Micro:        RADIOLOGY & ADDITIONAL TESTS: Reviewed.   INTERVAL HPI/OVERNIGHT EVENTS:    SUBJECTIVE: Patient seen and examined at bedside.     ROS: All negative except as listed above.    VITAL SIGNS:  ICU Vital Signs Last 24 Hrs  T(C): 36.1 (16 Aug 2023 04:00), Max: 37.2 (15 Aug 2023 08:43)  T(F): 97 (16 Aug 2023 04:00), Max: 98.9 (15 Aug 2023 08:43)  HR: 129 (16 Aug 2023 06:00) (104 - 140)  BP: 134/76 (16 Aug 2023 06:00) (106/71 - 187/86)  BP(mean): 86 (16 Aug 2023 06:00) (80 - 132)  ABP: --  ABP(mean): --  RR: 22 (16 Aug 2023 06:00) (19 - 34)  SpO2: 98% (16 Aug 2023 06:00) (94% - 100%)    O2 Parameters below as of 16 Aug 2023 06:00  Patient On (Oxygen Delivery Method): BiPAP/CPAP  O2 Flow (L/min): 40          Plateau pressure:   P/F ratio:     08-15 @ 07:01  -  08-16 @ 07:00  --------------------------------------------------------  IN: 12 mL / OUT: 400 mL / NET: -388 mL      CAPILLARY BLOOD GLUCOSE          ECG: reviewed.    PHYSICAL EXAM:    GENERAL: NAD, lying in bed comfortably  HEAD:  Atraumatic, normocephalic  EYES: EOMI, PERRLA, conjunctiva and sclera clear  NECK: Supple, trachea midline, no JVD  HEART: Regular rate and rhythm, no murmurs, rubs, or gallops  LUNGS: -----  ABDOMEN: Soft, nontender, nondistended, +BS  EXTREMITIES: 2+ peripheral pulses bilaterally, cap refill<2 secs. No clubbing, cyanosis, or edema  NERVOUS SYSTEM:  A&Ox3, following commands, moving all extremities, no focal deficits   SKIN: No rashes or lesions    MEDICATIONS:  MEDICATIONS  (STANDING):  albuterol Continuous Nebulization (Vibrating Mesh Nebulizer) 10 mG/Hr (4 mL/Hr) Continuous Inhalation. <Continuous>  chlorhexidine 2% Cloths 1 Application(s) Topical <User Schedule>  dexMEDEtomidine Infusion 0.2 MICROgram(s)/kG/Hr (3.94 mL/Hr) IV Continuous <Continuous>    MEDICATIONS  (PRN):      ALLERGIES:  Allergies    No Known Allergies    Intolerances        LABS:                        14.6   9.53  )-----------( 229      ( 16 Aug 2023 01:10 )             44.4     08-16    136  |  100  |  12  ----------------------------<  158<H>  3.9   |  23  |  0.88    Ca    9.1      16 Aug 2023 01:10  Phos  2.4     08-16  Mg     3.00     08-16    TPro  7.7  /  Alb  4.8  /  TBili  0.5  /  DBili  x   /  AST  22  /  ALT  19  /  AlkPhos  72  08-15    PT/INR - ( 16 Aug 2023 01:20 )   PT: 11.4 sec;   INR: 1.02 ratio         PTT - ( 16 Aug 2023 01:20 )  PTT:31.7 sec  Urinalysis Basic - ( 16 Aug 2023 01:10 )    Color: x / Appearance: x / SG: x / pH: x  Gluc: 158 mg/dL / Ketone: x  / Bili: x / Urobili: x   Blood: x / Protein: x / Nitrite: x   Leuk Esterase: x / RBC: x / WBC x   Sq Epi: x / Non Sq Epi: x / Bacteria: x      ABG:      vBG:  pH, Venous: 7.40 (08-16-23 @ 06:10)  pCO2, Venous: 38 mmHg (08-16-23 @ 06:10)  pO2, Venous: 101 mmHg (08-16-23 @ 06:10)  HCO3, Venous: 24 mmol/L (08-16-23 @ 06:10)  pH, Venous: 7.40 (08-16-23 @ 01:10)  pCO2, Venous: 41 mmHg (08-16-23 @ 01:10)  pO2, Venous: 213 mmHg (08-16-23 @ 01:10)  HCO3, Venous: 25 mmol/L (08-16-23 @ 01:10)  pH, Venous: 7.31 (08-15-23 @ 22:25)  pCO2, Venous: 41 mmHg (08-15-23 @ 22:25)  pO2, Venous: 119 mmHg (08-15-23 @ 22:25)  HCO3, Venous: 21 mmol/L (08-15-23 @ 22:25)  pH, Venous: 7.42 (08-15-23 @ 19:19)  pCO2, Venous: 37 mmHg (08-15-23 @ 19:19)  pO2, Venous: 71 mmHg (08-15-23 @ 19:19)  HCO3, Venous: 24 mmol/L (08-15-23 @ 19:19)    Micro:        RADIOLOGY & ADDITIONAL TESTS: Reviewed.   INTERVAL HPI/OVERNIGHT EVENTS:    SUBJECTIVE: Patient seen and examined at bedside.     ROS: All negative except as listed above.    VITAL SIGNS:  ICU Vital Signs Last 24 Hrs  T(C): 36.1 (16 Aug 2023 04:00), Max: 37.2 (15 Aug 2023 08:43)  T(F): 97 (16 Aug 2023 04:00), Max: 98.9 (15 Aug 2023 08:43)  HR: 129 (16 Aug 2023 06:00) (104 - 140)  BP: 134/76 (16 Aug 2023 06:00) (106/71 - 187/86)  BP(mean): 86 (16 Aug 2023 06:00) (80 - 132)  ABP: --  ABP(mean): --  RR: 22 (16 Aug 2023 06:00) (19 - 34)  SpO2: 98% (16 Aug 2023 06:00) (94% - 100%)    O2 Parameters below as of 16 Aug 2023 06:00  Patient On (Oxygen Delivery Method): BiPAP/CPAP  O2 Flow (L/min): 40          Plateau pressure:   P/F ratio:     08-15 @ 07:01  -  08-16 @ 07:00  --------------------------------------------------------  IN: 12 mL / OUT: 400 mL / NET: -388 mL      CAPILLARY BLOOD GLUCOSE          ECG: reviewed.    PHYSICAL EXAM:    GENERAL: NAD, lying in bed, some discomfort  HEAD:  Atraumatic, normocephalic  EYES: EOMI, PERRLA, conjunctiva and sclera clear  NECK: Supple, trachea midline, no JVD  HEART: Regular rate and rhythm, no murmurs, rubs, or gallops  LUNGS: scattered wheezing  ABDOMEN: Soft, nontender, nondistended, +BS  EXTREMITIES: 2+ peripheral pulses bilaterally, cap refill<2 secs. No clubbing, cyanosis, or edema  NERVOUS SYSTEM:  A&Ox3, following commands, moving all extremities, no focal deficits   SKIN: No rashes or lesions    MEDICATIONS:  MEDICATIONS  (STANDING):  albuterol Continuous Nebulization (Vibrating Mesh Nebulizer) 10 mG/Hr (4 mL/Hr) Continuous Inhalation. <Continuous>  chlorhexidine 2% Cloths 1 Application(s) Topical <User Schedule>  dexMEDEtomidine Infusion 0.2 MICROgram(s)/kG/Hr (3.94 mL/Hr) IV Continuous <Continuous>    MEDICATIONS  (PRN):      ALLERGIES:  Allergies    No Known Allergies    Intolerances        LABS:                        14.6   9.53  )-----------( 229      ( 16 Aug 2023 01:10 )             44.4     08-16    136  |  100  |  12  ----------------------------<  158<H>  3.9   |  23  |  0.88    Ca    9.1      16 Aug 2023 01:10  Phos  2.4     08-16  Mg     3.00     08-16    TPro  7.7  /  Alb  4.8  /  TBili  0.5  /  DBili  x   /  AST  22  /  ALT  19  /  AlkPhos  72  08-15    PT/INR - ( 16 Aug 2023 01:20 )   PT: 11.4 sec;   INR: 1.02 ratio         PTT - ( 16 Aug 2023 01:20 )  PTT:31.7 sec  Urinalysis Basic - ( 16 Aug 2023 01:10 )    Color: x / Appearance: x / SG: x / pH: x  Gluc: 158 mg/dL / Ketone: x  / Bili: x / Urobili: x   Blood: x / Protein: x / Nitrite: x   Leuk Esterase: x / RBC: x / WBC x   Sq Epi: x / Non Sq Epi: x / Bacteria: x      ABG:      vBG:  pH, Venous: 7.40 (08-16-23 @ 06:10)  pCO2, Venous: 38 mmHg (08-16-23 @ 06:10)  pO2, Venous: 101 mmHg (08-16-23 @ 06:10)  HCO3, Venous: 24 mmol/L (08-16-23 @ 06:10)  pH, Venous: 7.40 (08-16-23 @ 01:10)  pCO2, Venous: 41 mmHg (08-16-23 @ 01:10)  pO2, Venous: 213 mmHg (08-16-23 @ 01:10)  HCO3, Venous: 25 mmol/L (08-16-23 @ 01:10)  pH, Venous: 7.31 (08-15-23 @ 22:25)  pCO2, Venous: 41 mmHg (08-15-23 @ 22:25)  pO2, Venous: 119 mmHg (08-15-23 @ 22:25)  HCO3, Venous: 21 mmol/L (08-15-23 @ 22:25)  pH, Venous: 7.42 (08-15-23 @ 19:19)  pCO2, Venous: 37 mmHg (08-15-23 @ 19:19)  pO2, Venous: 71 mmHg (08-15-23 @ 19:19)  HCO3, Venous: 24 mmol/L (08-15-23 @ 19:19)    Micro:  Parainfluenza 3 (RapRVP): Detected          RADIOLOGY & ADDITIONAL TESTS: Reviewed.

## 2023-08-17 LAB
ANION GAP SERPL CALC-SCNC: 10 MMOL/L — SIGNIFICANT CHANGE UP (ref 7–14)
ANION GAP SERPL CALC-SCNC: 10 MMOL/L — SIGNIFICANT CHANGE UP (ref 7–14)
BASE EXCESS BLDV CALC-SCNC: 0.8 MMOL/L — SIGNIFICANT CHANGE UP (ref -2–3)
BASOPHILS # BLD AUTO: 0.01 K/UL — SIGNIFICANT CHANGE UP (ref 0–0.2)
BASOPHILS # BLD AUTO: 0.01 K/UL — SIGNIFICANT CHANGE UP (ref 0–0.2)
BASOPHILS NFR BLD AUTO: 0.1 % — SIGNIFICANT CHANGE UP (ref 0–2)
BASOPHILS NFR BLD AUTO: 0.1 % — SIGNIFICANT CHANGE UP (ref 0–2)
BUN SERPL-MCNC: 26 MG/DL — HIGH (ref 7–23)
BUN SERPL-MCNC: 26 MG/DL — HIGH (ref 7–23)
CALCIUM SERPL-MCNC: 8.5 MG/DL — SIGNIFICANT CHANGE UP (ref 8.4–10.5)
CALCIUM SERPL-MCNC: 8.9 MG/DL — SIGNIFICANT CHANGE UP (ref 8.4–10.5)
CHLORIDE SERPL-SCNC: 102 MMOL/L — SIGNIFICANT CHANGE UP (ref 98–107)
CHLORIDE SERPL-SCNC: 104 MMOL/L — SIGNIFICANT CHANGE UP (ref 98–107)
CO2 BLDV-SCNC: 28 MMOL/L — HIGH (ref 22–26)
CO2 SERPL-SCNC: 23 MMOL/L — SIGNIFICANT CHANGE UP (ref 22–31)
CO2 SERPL-SCNC: 26 MMOL/L — SIGNIFICANT CHANGE UP (ref 22–31)
CREAT SERPL-MCNC: 0.91 MG/DL — SIGNIFICANT CHANGE UP (ref 0.5–1.3)
CREAT SERPL-MCNC: 0.92 MG/DL — SIGNIFICANT CHANGE UP (ref 0.5–1.3)
EGFR: 93 ML/MIN/1.73M2 — SIGNIFICANT CHANGE UP
EGFR: 95 ML/MIN/1.73M2 — SIGNIFICANT CHANGE UP
EOSINOPHIL # BLD AUTO: 0 K/UL — SIGNIFICANT CHANGE UP (ref 0–0.5)
EOSINOPHIL # BLD AUTO: 0 K/UL — SIGNIFICANT CHANGE UP (ref 0–0.5)
EOSINOPHIL NFR BLD AUTO: 0 % — SIGNIFICANT CHANGE UP (ref 0–6)
EOSINOPHIL NFR BLD AUTO: 0 % — SIGNIFICANT CHANGE UP (ref 0–6)
GLUCOSE SERPL-MCNC: 130 MG/DL — HIGH (ref 70–99)
GLUCOSE SERPL-MCNC: 140 MG/DL — HIGH (ref 70–99)
HCO3 BLDV-SCNC: 27 MMOL/L — SIGNIFICANT CHANGE UP (ref 22–29)
HCT VFR BLD CALC: 39 % — SIGNIFICANT CHANGE UP (ref 39–50)
HCT VFR BLD CALC: 41.4 % — SIGNIFICANT CHANGE UP (ref 39–50)
HGB BLD-MCNC: 12.5 G/DL — LOW (ref 13–17)
HGB BLD-MCNC: 13.5 G/DL — SIGNIFICANT CHANGE UP (ref 13–17)
IANC: 10.69 K/UL — HIGH (ref 1.8–7.4)
IANC: 8.18 K/UL — HIGH (ref 1.8–7.4)
IMM GRANULOCYTES NFR BLD AUTO: 0.2 % — SIGNIFICANT CHANGE UP (ref 0–0.9)
IMM GRANULOCYTES NFR BLD AUTO: 0.3 % — SIGNIFICANT CHANGE UP (ref 0–0.9)
LYMPHOCYTES # BLD AUTO: 0.83 K/UL — LOW (ref 1–3.3)
LYMPHOCYTES # BLD AUTO: 1.24 K/UL — SIGNIFICANT CHANGE UP (ref 1–3.3)
LYMPHOCYTES # BLD AUTO: 8.8 % — LOW (ref 13–44)
LYMPHOCYTES # BLD AUTO: 9.7 % — LOW (ref 13–44)
MAGNESIUM SERPL-MCNC: 2.3 MG/DL — SIGNIFICANT CHANGE UP (ref 1.6–2.6)
MAGNESIUM SERPL-MCNC: 2.4 MG/DL — SIGNIFICANT CHANGE UP (ref 1.6–2.6)
MCHC RBC-ENTMCNC: 29.6 PG — SIGNIFICANT CHANGE UP (ref 27–34)
MCHC RBC-ENTMCNC: 29.8 PG — SIGNIFICANT CHANGE UP (ref 27–34)
MCHC RBC-ENTMCNC: 32.1 GM/DL — SIGNIFICANT CHANGE UP (ref 32–36)
MCHC RBC-ENTMCNC: 32.6 GM/DL — SIGNIFICANT CHANGE UP (ref 32–36)
MCV RBC AUTO: 91.4 FL — SIGNIFICANT CHANGE UP (ref 80–100)
MCV RBC AUTO: 92.2 FL — SIGNIFICANT CHANGE UP (ref 80–100)
MONOCYTES # BLD AUTO: 0.37 K/UL — SIGNIFICANT CHANGE UP (ref 0–0.9)
MONOCYTES # BLD AUTO: 0.85 K/UL — SIGNIFICANT CHANGE UP (ref 0–0.9)
MONOCYTES NFR BLD AUTO: 3.9 % — SIGNIFICANT CHANGE UP (ref 2–14)
MONOCYTES NFR BLD AUTO: 6.6 % — SIGNIFICANT CHANGE UP (ref 2–14)
NEUTROPHILS # BLD AUTO: 10.69 K/UL — HIGH (ref 1.8–7.4)
NEUTROPHILS # BLD AUTO: 8.18 K/UL — HIGH (ref 1.8–7.4)
NEUTROPHILS NFR BLD AUTO: 83.3 % — HIGH (ref 43–77)
NEUTROPHILS NFR BLD AUTO: 87 % — HIGH (ref 43–77)
NRBC # BLD: 0 /100 WBCS — SIGNIFICANT CHANGE UP (ref 0–0)
NRBC # BLD: 0 /100 WBCS — SIGNIFICANT CHANGE UP (ref 0–0)
NRBC # FLD: 0 K/UL — SIGNIFICANT CHANGE UP (ref 0–0)
NRBC # FLD: 0 K/UL — SIGNIFICANT CHANGE UP (ref 0–0)
PCO2 BLDV: 46 MMHG — SIGNIFICANT CHANGE UP (ref 42–55)
PH BLDV: 7.37 — SIGNIFICANT CHANGE UP (ref 7.32–7.43)
PHOSPHATE SERPL-MCNC: 3.6 MG/DL — SIGNIFICANT CHANGE UP (ref 2.5–4.5)
PHOSPHATE SERPL-MCNC: 3.8 MG/DL — SIGNIFICANT CHANGE UP (ref 2.5–4.5)
PLATELET # BLD AUTO: 202 K/UL — SIGNIFICANT CHANGE UP (ref 150–400)
PLATELET # BLD AUTO: 227 K/UL — SIGNIFICANT CHANGE UP (ref 150–400)
PO2 BLDV: 55 MMHG — HIGH (ref 25–45)
POTASSIUM SERPL-MCNC: 4.2 MMOL/L — SIGNIFICANT CHANGE UP (ref 3.5–5.3)
POTASSIUM SERPL-MCNC: 4.9 MMOL/L — SIGNIFICANT CHANGE UP (ref 3.5–5.3)
POTASSIUM SERPL-SCNC: 4.2 MMOL/L — SIGNIFICANT CHANGE UP (ref 3.5–5.3)
POTASSIUM SERPL-SCNC: 4.9 MMOL/L — SIGNIFICANT CHANGE UP (ref 3.5–5.3)
RBC # BLD: 4.23 M/UL — SIGNIFICANT CHANGE UP (ref 4.2–5.8)
RBC # BLD: 4.53 M/UL — SIGNIFICANT CHANGE UP (ref 4.2–5.8)
RBC # FLD: 13.6 % — SIGNIFICANT CHANGE UP (ref 10.3–14.5)
RBC # FLD: 13.7 % — SIGNIFICANT CHANGE UP (ref 10.3–14.5)
SAO2 % BLDV: 87 % — SIGNIFICANT CHANGE UP (ref 67–88)
SODIUM SERPL-SCNC: 137 MMOL/L — SIGNIFICANT CHANGE UP (ref 135–145)
SODIUM SERPL-SCNC: 138 MMOL/L — SIGNIFICANT CHANGE UP (ref 135–145)
WBC # BLD: 12.83 K/UL — HIGH (ref 3.8–10.5)
WBC # BLD: 9.41 K/UL — SIGNIFICANT CHANGE UP (ref 3.8–10.5)
WBC # FLD AUTO: 12.83 K/UL — HIGH (ref 3.8–10.5)
WBC # FLD AUTO: 9.41 K/UL — SIGNIFICANT CHANGE UP (ref 3.8–10.5)

## 2023-08-17 PROCEDURE — 99233 SBSQ HOSP IP/OBS HIGH 50: CPT | Mod: GC

## 2023-08-17 RX ORDER — ALBUTEROL 90 UG/1
4 AEROSOL, METERED ORAL
Refills: 0 | Status: DISCONTINUED | OUTPATIENT
Start: 2023-08-17 | End: 2023-08-19

## 2023-08-17 RX ORDER — BUDESONIDE AND FORMOTEROL FUMARATE DIHYDRATE 160; 4.5 UG/1; UG/1
2 AEROSOL RESPIRATORY (INHALATION)
Refills: 0 | Status: DISCONTINUED | OUTPATIENT
Start: 2023-08-17 | End: 2023-08-20

## 2023-08-17 RX ORDER — IBUPROFEN 200 MG
600 TABLET ORAL ONCE
Refills: 0 | Status: COMPLETED | OUTPATIENT
Start: 2023-08-17 | End: 2023-08-17

## 2023-08-17 RX ORDER — ALBUTEROL 90 UG/1
4 AEROSOL, METERED ORAL
Refills: 0 | Status: DISCONTINUED | OUTPATIENT
Start: 2023-08-17 | End: 2023-08-17

## 2023-08-17 RX ADMIN — Medication 40 MILLIGRAM(S): at 08:53

## 2023-08-17 RX ADMIN — ENOXAPARIN SODIUM 40 MILLIGRAM(S): 100 INJECTION SUBCUTANEOUS at 08:53

## 2023-08-17 RX ADMIN — ALBUTEROL 4 PUFF(S): 90 AEROSOL, METERED ORAL at 17:56

## 2023-08-17 RX ADMIN — ALBUTEROL 4 PUFF(S): 90 AEROSOL, METERED ORAL at 10:16

## 2023-08-17 RX ADMIN — CHLORHEXIDINE GLUCONATE 1 APPLICATION(S): 213 SOLUTION TOPICAL at 06:22

## 2023-08-17 RX ADMIN — ALBUTEROL 4 PUFF(S): 90 AEROSOL, METERED ORAL at 15:09

## 2023-08-17 RX ADMIN — Medication 600 MILLIGRAM(S): at 22:49

## 2023-08-17 RX ADMIN — Medication 600 MILLIGRAM(S): at 21:07

## 2023-08-17 RX ADMIN — ALBUTEROL 4 PUFF(S): 90 AEROSOL, METERED ORAL at 13:06

## 2023-08-17 RX ADMIN — ALBUTEROL 4 PUFF(S): 90 AEROSOL, METERED ORAL at 07:12

## 2023-08-17 RX ADMIN — BUDESONIDE AND FORMOTEROL FUMARATE DIHYDRATE 2 PUFF(S): 160; 4.5 AEROSOL RESPIRATORY (INHALATION) at 22:16

## 2023-08-17 RX ADMIN — ALBUTEROL 4 PUFF(S): 90 AEROSOL, METERED ORAL at 03:31

## 2023-08-17 RX ADMIN — ALBUTEROL 4 PUFF(S): 90 AEROSOL, METERED ORAL at 22:14

## 2023-08-17 NOTE — DIETITIAN INITIAL EVALUATION ADULT - REASON FOR ADMISSION
Asthma with acute exacerbation    Per chart review, 61 yo M with medical history of asthma, current cannabis smoker presented to the ED earlier this morning with 3 days of asthma exacerbation, c/o cough, chills, productive sputum, SOB consistent with previous asthma exacerbations per chart review. Patient placed on new Bipap. S/p percedex for bipap tolerance noted.

## 2023-08-17 NOTE — CHART NOTE - NSCHARTNOTEFT_GEN_A_CORE
MAR Accept Note  Transfer to:    Accepting Attending Physician:    Assigned Room:      Patient seen and examined.   Labs and data reviewed.   No findings precluding transfer of service.       HPI/MICU COURSE:   Pt is a 61 yo M with PMHx of asthma, current cannabis smoker presented to the ED earlier this morning with 3 days of asthma exacerbation, c/o cough, chills, productive sputum, SOB consistent with previous asthma exacerbations. Denied fever, chest pain, pleuritic pain, nausea, vomiting, diarrhea, abd pain. Pt found to be parainfluenza+, CXR showing clear lungs, no focal consolidations. Pt received a rounds of duonebs, given Mag and albuterol neb, prednisone 40mg, continued monitoring in CDU/observation in ED then left after feeling better. Pt then returned to the ED after walking 10min to the car and re-developing SOB.     ED course: Vitals /90, , RR 23, afebrile, satting 94% RA. Labs remarkable for K 3.4 (received albuterol in earlier ED visit), VBG: pH 7.42, lactate 3.3, pCO2 37. EKG showing no signs of ischemia. Pt placed on BIPAP, received duoneb, decadron 10, Mag 4g, albuterol neb, epi .3 IM felt better then acutely worsened.  Pt was then unable to tolerate bipap, given ketamine 20, started tolerating BIPAP.    MICU consulted for new BIPAP.    MICU COURSE:  Patient arrived to MICU on bipap. He was started on methylprednisone 40mg q12hrs and albuterol nebs q2hrs, with plans to taper his methylprednisone starting 8/17. His respiratory status improved and he was able to be transitioned from Bipap to nasal cannula at 4L. Patient was saturating in high 90's on 4L NC, and was monitored for a few hours after being transitioned with no changes in respiratory status. Patient medically cleared for step-down to medical floors, without any acute MICU needs.      ASSESSMENT & PLAN:   Pt is a 61 yo M with PMHx of asthma, current cannabis smoker presented to the ED with 3 days of asthma exacerbation, found to be parainfluenza+, CXR showing clear lungs no consolidation, eventually requiring BIPAP and admitted to MICU.    #Neuro  - Precedex gtt for toleration of BIPAP    #Skin  - No active issues    #GI  - Diet: NPO while on BiPAP, eligible for oral intake when RR stable < 30    #Metabolic  - No active issues    #Volume/  - No active issues  - Started D5 75 cc/h    #Heme/onc  - Lovenox 40 for DVT ppx    #Cardio  - EKG wnl  - No active issues    #Pulm  - Severe asthma exacerbation likely 2/2 viral infection  - s/p epi, multiple Mg push, Duonebs, albuterol in ED  - s/p steroids pred 40, deca 10  - s/p Precedex for BIPAP toleration  - Methylpred 40 bid today, then taper to 40 qd on tomorrow (8/17)  - Switch continuous albuterol to albuterol q2 via NC without ipratropium    #Ethics  Code status: full.   Pt requests no intubation if possible.  Lines: PIV    For Followup:  [ ] Taper methylprednisone  [ ] Continue albuterol  [ ] Wean pt off NC

## 2023-08-17 NOTE — DIETITIAN INITIAL EVALUATION ADULT - PERTINENT MEDS FT
MEDICATIONS  (STANDING):  budesonide 160 MICROgram(s)/formoterol 4.5 MICROgram(s) Inhaler 2 Puff(s) Inhalation two times a day  chlorhexidine 2% Cloths 1 Application(s) Topical <User Schedule>  dextrose 5%. 1000 milliLiter(s) (75 mL/Hr) IV Continuous <Continuous>  enoxaparin Injectable 40 milliGRAM(s) SubCutaneous every 24 hours    MEDICATIONS  (PRN):  albuterol    90 MICROgram(s) HFA Inhaler 4 Puff(s) Inhalation every 2 hours PRN Shortness of Breath and/or Wheezing

## 2023-08-17 NOTE — PROGRESS NOTE ADULT - SUBJECTIVE AND OBJECTIVE BOX
INTERVAL HPI/OVERNIGHT EVENTS:    SUBJECTIVE: Patient seen and examined at bedside.     ROS: All negative except as listed above.    VITAL SIGNS:  ICU Vital Signs Last 24 Hrs  T(C): 36.1 (17 Aug 2023 04:00), Max: 36.8 (16 Aug 2023 08:00)  T(F): 97 (17 Aug 2023 04:00), Max: 98.2 (16 Aug 2023 08:00)  HR: 90 (17 Aug 2023 07:18) (81 - 128)  BP: 146/92 (17 Aug 2023 06:00) (98/58 - 146/92)  BP(mean): 105 (17 Aug 2023 06:00) (57 - 105)  ABP: --  ABP(mean): --  RR: 23 (17 Aug 2023 06:00) (16 - 29)  SpO2: 98% (17 Aug 2023 07:18) (96% - 100%)    O2 Parameters below as of 17 Aug 2023 07:15  Patient On (Oxygen Delivery Method): nasal cannula            Plateau pressure:   P/F ratio:     08-16 @ 07:01  -  08-17 @ 07:00  --------------------------------------------------------  IN: 1885.7 mL / OUT: 1350 mL / NET: 535.7 mL      CAPILLARY BLOOD GLUCOSE          ECG: reviewed.    PHYSICAL EXAM:    GENERAL: NAD, lying in bed comfortably  HEAD:  Atraumatic, normocephalic  EYES: EOMI, PERRLA, conjunctiva and sclera clear  NECK: Supple, trachea midline, no JVD  HEART: Regular rate and rhythm, no murmurs, rubs, or gallops  LUNGS: Unlabored respirations.  Clear to auscultation bilaterally, no crackles, wheezing, or rhonchi  ABDOMEN: Soft, nontender, nondistended, +BS  EXTREMITIES: 2+ peripheral pulses bilaterally, cap refill<2 secs. No clubbing, cyanosis, or edema  NERVOUS SYSTEM:  A&Ox3, following commands, moving all extremities, no focal deficits   SKIN: No rashes or lesions    MEDICATIONS:  MEDICATIONS  (STANDING):  albuterol    90 MICROgram(s) HFA Inhaler 4 Puff(s) Inhalation every 3 hours  chlorhexidine 2% Cloths 1 Application(s) Topical <User Schedule>  dextrose 5%. 1000 milliLiter(s) (75 mL/Hr) IV Continuous <Continuous>  enoxaparin Injectable 40 milliGRAM(s) SubCutaneous every 24 hours  methylPREDNISolone sodium succinate Injectable 40 milliGRAM(s) IV Push two times a day    MEDICATIONS  (PRN):      ALLERGIES:  Allergies    No Known Allergies    Intolerances        LABS:                        13.5   12.83 )-----------( 227      ( 17 Aug 2023 05:58 )             41.4     08-17    138  |  102  |  26<H>  ----------------------------<  130<H>  4.9   |  26  |  0.92    Ca    8.9      17 Aug 2023 05:58  Phos  3.6     08-17  Mg     2.30     08-17    TPro  7.7  /  Alb  4.8  /  TBili  0.5  /  DBili  x   /  AST  22  /  ALT  19  /  AlkPhos  72  08-15    PT/INR - ( 16 Aug 2023 01:20 )   PT: 11.4 sec;   INR: 1.02 ratio         PTT - ( 16 Aug 2023 01:20 )  PTT:31.7 sec  Urinalysis Basic - ( 17 Aug 2023 05:58 )    Color: x / Appearance: x / SG: x / pH: x  Gluc: 130 mg/dL / Ketone: x  / Bili: x / Urobili: x   Blood: x / Protein: x / Nitrite: x   Leuk Esterase: x / RBC: x / WBC x   Sq Epi: x / Non Sq Epi: x / Bacteria: x      ABG:      vBG:  pH, Venous: 7.37 (08-17-23 @ 01:46)  pCO2, Venous: 46 mmHg (08-17-23 @ 01:46)  pO2, Venous: 55 mmHg (08-17-23 @ 01:46)  HCO3, Venous: 27 mmol/L (08-17-23 @ 01:46)    Micro:    Culture - Blood (collected 08-16-23 @ 01:15)  Source: .Blood Blood-Peripheral  Preliminary Report (08-17-23 @ 07:02):    No growth at 24 hours    Culture - Blood (collected 08-16-23 @ 01:10)  Source: .Blood Blood-Peripheral  Preliminary Report (08-17-23 @ 07:02):    No growth at 24 hours          RADIOLOGY & ADDITIONAL TESTS: Reviewed.    < from: Xray Chest 2 Views PA/Lat (08.15.23 @ 07:14) >  INTERPRETATION:  EXAMINATION: XR CHEST PA AND LATERAL    CLINICAL INDICATION: asthma exacerbation    TECHNIQUE: 2 views; Frontal and lateral views of the chest were obtained.    COMPARISON: Chest x-ray 9/16/2018.    FINDINGS:  The heart is normal in size.  The lungs are clear.  There is no pneumothorax or pleural effusion.  No acute bony abnormality.    IMPRESSION:  Clear lungs.    --- End of Report ---    < end of copied text >     INTERVAL HPI/OVERNIGHT EVENTS:    SUBJECTIVE: Patient seen and examined at bedside. No major events overnight. Patient reports feeling better compared to yesterday, currently at 75% of baseline. Patient has no concerns.    ROS: All negative except as listed above.    VITAL SIGNS:  ICU Vital Signs Last 24 Hrs  T(C): 36.1 (17 Aug 2023 04:00), Max: 36.8 (16 Aug 2023 08:00)  T(F): 97 (17 Aug 2023 04:00), Max: 98.2 (16 Aug 2023 08:00)  HR: 90 (17 Aug 2023 07:18) (81 - 128)  BP: 146/92 (17 Aug 2023 06:00) (98/58 - 146/92)  BP(mean): 105 (17 Aug 2023 06:00) (57 - 105)  ABP: --  ABP(mean): --  RR: 23 (17 Aug 2023 06:00) (16 - 29)  SpO2: 98% (17 Aug 2023 07:18) (96% - 100%)    O2 Parameters below as of 17 Aug 2023 07:15  Patient On (Oxygen Delivery Method): nasal cannula            Plateau pressure:   P/F ratio:     08-16 @ 07:01  -  08-17 @ 07:00  --------------------------------------------------------  IN: 1885.7 mL / OUT: 1350 mL / NET: 535.7 mL      CAPILLARY BLOOD GLUCOSE          ECG: reviewed.    PHYSICAL EXAM:    GENERAL: NAD, lying in bed comfortably  HEAD:  Atraumatic, normocephalic  EYES: EOMI, PERRLA, conjunctiva and sclera clear  NECK: Supple, trachea midline, no JVD  HEART: Regular rate and rhythm, no murmurs, rubs, or gallops  LUNGS: Unlabored respirations.  Clear to auscultation bilaterally, no crackles, wheezing, or rhonchi  ABDOMEN: Soft, nontender, nondistended, +BS  EXTREMITIES: 2+ peripheral pulses bilaterally, cap refill<2 secs. No clubbing, cyanosis, or edema  NERVOUS SYSTEM:  A&Ox3, following commands, moving all extremities, no focal deficits   SKIN: No rashes or lesions    MEDICATIONS:  MEDICATIONS  (STANDING):  albuterol    90 MICROgram(s) HFA Inhaler 4 Puff(s) Inhalation every 3 hours  chlorhexidine 2% Cloths 1 Application(s) Topical <User Schedule>  dextrose 5%. 1000 milliLiter(s) (75 mL/Hr) IV Continuous <Continuous>  enoxaparin Injectable 40 milliGRAM(s) SubCutaneous every 24 hours  methylPREDNISolone sodium succinate Injectable 40 milliGRAM(s) IV Push two times a day    MEDICATIONS  (PRN):      ALLERGIES:  Allergies    No Known Allergies    Intolerances        LABS:                        13.5   12.83 )-----------( 227      ( 17 Aug 2023 05:58 )             41.4     08-17    138  |  102  |  26<H>  ----------------------------<  130<H>  4.9   |  26  |  0.92    Ca    8.9      17 Aug 2023 05:58  Phos  3.6     08-17  Mg     2.30     08-17    TPro  7.7  /  Alb  4.8  /  TBili  0.5  /  DBili  x   /  AST  22  /  ALT  19  /  AlkPhos  72  08-15    PT/INR - ( 16 Aug 2023 01:20 )   PT: 11.4 sec;   INR: 1.02 ratio         PTT - ( 16 Aug 2023 01:20 )  PTT:31.7 sec  Urinalysis Basic - ( 17 Aug 2023 05:58 )    Color: x / Appearance: x / SG: x / pH: x  Gluc: 130 mg/dL / Ketone: x  / Bili: x / Urobili: x   Blood: x / Protein: x / Nitrite: x   Leuk Esterase: x / RBC: x / WBC x   Sq Epi: x / Non Sq Epi: x / Bacteria: x      ABG:      vBG:  pH, Venous: 7.37 (08-17-23 @ 01:46)  pCO2, Venous: 46 mmHg (08-17-23 @ 01:46)  pO2, Venous: 55 mmHg (08-17-23 @ 01:46)  HCO3, Venous: 27 mmol/L (08-17-23 @ 01:46)    Micro:    Culture - Blood (collected 08-16-23 @ 01:15)  Source: .Blood Blood-Peripheral  Preliminary Report (08-17-23 @ 07:02):    No growth at 24 hours    Culture - Blood (collected 08-16-23 @ 01:10)  Source: .Blood Blood-Peripheral  Preliminary Report (08-17-23 @ 07:02):    No growth at 24 hours          RADIOLOGY & ADDITIONAL TESTS: Reviewed.    < from: Xray Chest 2 Views PA/Lat (08.15.23 @ 07:14) >  INTERPRETATION:  EXAMINATION: XR CHEST PA AND LATERAL    CLINICAL INDICATION: asthma exacerbation    TECHNIQUE: 2 views; Frontal and lateral views of the chest were obtained.    COMPARISON: Chest x-ray 9/16/2018.    FINDINGS:  The heart is normal in size.  The lungs are clear.  There is no pneumothorax or pleural effusion.  No acute bony abnormality.    IMPRESSION:  Clear lungs.    --- End of Report ---    < end of copied text >     INTERVAL HPI/OVERNIGHT EVENTS:    SUBJECTIVE: Patient seen and examined at bedside. No major events overnight. At 7am, patient reports feeling better compared to yesterday, currently at 75% of baseline. Patient has no concerns. During bedside rounds, patient reported some shortness of breath and was seen sitting up.    ROS: All negative except as listed above.    VITAL SIGNS:  ICU Vital Signs Last 24 Hrs  T(C): 36.1 (17 Aug 2023 04:00), Max: 36.8 (16 Aug 2023 08:00)  T(F): 97 (17 Aug 2023 04:00), Max: 98.2 (16 Aug 2023 08:00)  HR: 90 (17 Aug 2023 07:18) (81 - 128)  BP: 146/92 (17 Aug 2023 06:00) (98/58 - 146/92)  BP(mean): 105 (17 Aug 2023 06:00) (57 - 105)  ABP: --  ABP(mean): --  RR: 23 (17 Aug 2023 06:00) (16 - 29)  SpO2: 98% (17 Aug 2023 07:18) (96% - 100%)    O2 Parameters below as of 17 Aug 2023 07:15  Patient On (Oxygen Delivery Method): nasal cannula            Plateau pressure:   P/F ratio:     08-16 @ 07:01  -  08-17 @ 07:00  --------------------------------------------------------  IN: 1885.7 mL / OUT: 1350 mL / NET: 535.7 mL      CAPILLARY BLOOD GLUCOSE          ECG: reviewed.    PHYSICAL EXAM:    GENERAL: NAD, lying in bed comfortably  HEAD:  Atraumatic, normocephalic  EYES: EOMI, PERRLA, conjunctiva and sclera clear  NECK: Supple, trachea midline, no JVD  HEART: Regular rate and rhythm, no murmurs, rubs, or gallops  LUNGS: scattered expiratory wheezes with some accessory muscle use  ABDOMEN: Soft, nontender, nondistended, +BS  EXTREMITIES: 2+ peripheral pulses bilaterally, cap refill<2 secs. No clubbing, cyanosis, or edema  NERVOUS SYSTEM:  A&Ox3, following commands, moving all extremities, no focal deficits   SKIN: No rashes or lesions    MEDICATIONS:  MEDICATIONS  (STANDING):  albuterol    90 MICROgram(s) HFA Inhaler 4 Puff(s) Inhalation every 3 hours  chlorhexidine 2% Cloths 1 Application(s) Topical <User Schedule>  dextrose 5%. 1000 milliLiter(s) (75 mL/Hr) IV Continuous <Continuous>  enoxaparin Injectable 40 milliGRAM(s) SubCutaneous every 24 hours  methylPREDNISolone sodium succinate Injectable 40 milliGRAM(s) IV Push two times a day    MEDICATIONS  (PRN):      ALLERGIES:  Allergies    No Known Allergies    Intolerances        LABS:                        13.5   12.83 )-----------( 227      ( 17 Aug 2023 05:58 )             41.4     08-17    138  |  102  |  26<H>  ----------------------------<  130<H>  4.9   |  26  |  0.92    Ca    8.9      17 Aug 2023 05:58  Phos  3.6     08-17  Mg     2.30     08-17    TPro  7.7  /  Alb  4.8  /  TBili  0.5  /  DBili  x   /  AST  22  /  ALT  19  /  AlkPhos  72  08-15    PT/INR - ( 16 Aug 2023 01:20 )   PT: 11.4 sec;   INR: 1.02 ratio         PTT - ( 16 Aug 2023 01:20 )  PTT:31.7 sec  Urinalysis Basic - ( 17 Aug 2023 05:58 )    Color: x / Appearance: x / SG: x / pH: x  Gluc: 130 mg/dL / Ketone: x  / Bili: x / Urobili: x   Blood: x / Protein: x / Nitrite: x   Leuk Esterase: x / RBC: x / WBC x   Sq Epi: x / Non Sq Epi: x / Bacteria: x      ABG:      vBG:  pH, Venous: 7.37 (08-17-23 @ 01:46)  pCO2, Venous: 46 mmHg (08-17-23 @ 01:46)  pO2, Venous: 55 mmHg (08-17-23 @ 01:46)  HCO3, Venous: 27 mmol/L (08-17-23 @ 01:46)    Micro:    Culture - Blood (collected 08-16-23 @ 01:15)  Source: .Blood Blood-Peripheral  Preliminary Report (08-17-23 @ 07:02):    No growth at 24 hours    Culture - Blood (collected 08-16-23 @ 01:10)  Source: .Blood Blood-Peripheral  Preliminary Report (08-17-23 @ 07:02):    No growth at 24 hours          RADIOLOGY & ADDITIONAL TESTS: Reviewed.    < from: Xray Chest 2 Views PA/Lat (08.15.23 @ 07:14) >  INTERPRETATION:  EXAMINATION: XR CHEST PA AND LATERAL    CLINICAL INDICATION: asthma exacerbation    TECHNIQUE: 2 views; Frontal and lateral views of the chest were obtained.    COMPARISON: Chest x-ray 9/16/2018.    FINDINGS:  The heart is normal in size.  The lungs are clear.  There is no pneumothorax or pleural effusion.  No acute bony abnormality.    IMPRESSION:  Clear lungs.    --- End of Report ---    < end of copied text >

## 2023-08-17 NOTE — DIETITIAN INITIAL EVALUATION ADULT - ADD RECOMMEND
No
1. Monitor weights, labs, BM's, skin integrity, p.o. intake.   2. Please document % PO intake in nursing flowsheet.   3. Honor food and beverage preferences within diet restriction of patient in an effort to maximize level of nutrient intake.

## 2023-08-17 NOTE — DIETITIAN INITIAL EVALUATION ADULT - OTHER INFO
Patient reports good tolerance to diet. Eating >75% of meals. Patient denies any nausea/vomiting/diarrhea/constipation or difficulty chewing and swallowing. +BM today. Importance of having well balanced, nutrient and protein dense foods encouraged. Patient denies any weight changes. Per [facility] HIE: 1/6/18- 79.3kg and now 8/16/23- 78.7kg weight relatively stable.

## 2023-08-17 NOTE — DIETITIAN INITIAL EVALUATION ADULT - PERTINENT LABORATORY DATA
08-17    138  |  102  |  26<H>  ----------------------------<  130<H>  4.9   |  26  |  0.92    Ca    8.9      17 Aug 2023 05:58  Phos  3.6     08-17  Mg     2.30     08-17    TPro  7.7  /  Alb  4.8  /  TBili  0.5  /  DBili  x   /  AST  22  /  ALT  19  /  AlkPhos  72  08-15

## 2023-08-17 NOTE — PROGRESS NOTE ADULT - ASSESSMENT
Pt is a 61 yo M with PMHx of asthma on qd Symbicort does not have an albuterol inh, current cannabis smoker presented to the ED 8/16 with 3 days of asthma exacerbation, found to be parainfluenza+, CXR showing clear lungs no consolidation. He left after receiving treatment feeling better, but re-presented to ED 10 minutes later for SOB again with decreased air movement in all lung fields, put on BIPAP tolerated with Precedex.    #Neuro  - No active issues  - Off Precedex gtt    #Skin  - No active issues    #GI  - Diet: Regular (preference: no beef, no pork)    #Metabolic  - No active issues    #Volume/  - No active issues  - 8/16: started D5 75 cc/h x10 hours, finished at 6PM    #Heme/onc  - Lovenox 40     #ID  - RVP+ for Parainfluenza 3  - Supportive care    #Cardio  - EKG wnl  - No active issues    #Pulm  - Severe asthma exacerbation likely 2/2 viral infection  - s/p epi, multiple Mg push, Duonebs, albuterol in ED  - s/p steroids pred 40, deca 10  - s/p Precedex for BIPAP toleration  - s/p Methylpred 40 bid (8/16), to taper to 40 qd today (8/17)  - d/c'd continuous albuterol  - Proventil (albuterol) HFA 4 puffs q3    #Ethics  Code status: full.   Pt requests no intubation if possible.  Lines: PIV   Pt is a 63 yo M with PMHx of asthma on qd Symbicort does not have an albuterol inh, current cannabis smoker presented to the ED 8/16 with 3 days of asthma exacerbation, found to be parainfluenza+, CXR showing clear lungs no consolidation. He left after receiving treatment feeling better, but re-presented to ED 10 minutes later for SOB again with decreased air movement in all lung fields, put on BIPAP tolerated with Precedex.    #Neuro  - No active issues  - Off Precedex gtt    #Skin  - No active issues    #GI  - Diet: Regular (preference: no beef, no pork)    #Metabolic  - No active issues    #Volume/  - No active issues  - 8/16: started D5 75 cc/h x10 hours, finished at 6PM    #Heme/onc  - Lovenox 40     #ID  - RVP+ for Parainfluenza 3, BCx NGTD  - Supportive care    #Cardio  - EKG wnl  - No active issues    #Pulm  - Severe asthma exacerbation likely 2/2 viral infection  - s/p epi, multiple Mg push, Duonebs, albuterol in ED  - s/p steroids pred 40, deca 10  - s/p Precedex for BIPAP toleration  - s/p Methylpred 40 bid (8/16), to taper to 40 qd today (8/18) x3 days  - d/c'd continuous albuterol  - Proventil (albuterol) HFA 4 puffs q2 with prn  - Budesonide 160 mcg/formoterol 4.5 mcg inhaler  - Pulm f/u for workup of RAD +/- emphysema    #Ethics  Code status: full.   Pt requests no intubation if possible.  Lines: PIV   Pt is a 63 yo M with PMHx of asthma on qd Symbicort does not have an albuterol inh, current cannabis smoker presented to the ED 8/16 with 3 days of asthma exacerbation, found to be parainfluenza+, CXR showing clear lungs no consolidation. He left after receiving treatment feeling better, but re-presented to ED 10 minutes later for SOB again with decreased air movement in all lung fields, put on BIPAP tolerated with Precedex.    #Neuro  - No active issues  - Off Precedex gtt    #Skin  - No active issues    #GI  - Diet: Regular (preference: no beef, no pork)    #Metabolic  - No active issues    #Volume/  - No active issues  - 8/16: started D5 75 cc/h x10 hours, finished at 6PM    #Heme/onc  - Lovenox 40     #ID  - RVP+ for Parainfluenza 3, BCx NGTD  - Supportive care    #Cardio  - EKG wnl  - No active issues    #Pulm  - Severe asthma exacerbation likely 2/2 viral infection  - s/p epi, multiple Mg push, Duonebs, albuterol in ED  - s/p steroids pred 40, deca 10  - s/p Precedex for BIPAP toleration  - s/p Methylpred 40 bid (8/16), to taper to 40 qd 8/18  - d/c'd continuous albuterol  - Proventil (albuterol) HFA 4 puffs q2 with prn  - Budesonide 160 mcg/formoterol 4.5 mcg inhaler  - Pulm f/u for workup of RAD +/- emphysema    #Ethics  Code status: full.   Pt requests no intubation if possible.  Lines: PIV

## 2023-08-17 NOTE — DIETITIAN INITIAL EVALUATION ADULT - ORAL INTAKE PTA/DIET HISTORY
Met with patient at bedside. Reports good appetite and PO intake PTA. No beef, pork and eggs reported-same implemented.

## 2023-08-18 LAB
ANION GAP SERPL CALC-SCNC: 11 MMOL/L — SIGNIFICANT CHANGE UP (ref 7–14)
BASOPHILS # BLD AUTO: 0.03 K/UL — SIGNIFICANT CHANGE UP (ref 0–0.2)
BASOPHILS NFR BLD AUTO: 0.2 % — SIGNIFICANT CHANGE UP (ref 0–2)
BUN SERPL-MCNC: 29 MG/DL — HIGH (ref 7–23)
CALCIUM SERPL-MCNC: 9.3 MG/DL — SIGNIFICANT CHANGE UP (ref 8.4–10.5)
CHLORIDE SERPL-SCNC: 98 MMOL/L — SIGNIFICANT CHANGE UP (ref 98–107)
CO2 SERPL-SCNC: 26 MMOL/L — SIGNIFICANT CHANGE UP (ref 22–31)
CREAT SERPL-MCNC: 0.92 MG/DL — SIGNIFICANT CHANGE UP (ref 0.5–1.3)
EGFR: 93 ML/MIN/1.73M2 — SIGNIFICANT CHANGE UP
EOSINOPHIL # BLD AUTO: 0 K/UL — SIGNIFICANT CHANGE UP (ref 0–0.5)
EOSINOPHIL NFR BLD AUTO: 0 % — SIGNIFICANT CHANGE UP (ref 0–6)
GLUCOSE SERPL-MCNC: 103 MG/DL — HIGH (ref 70–99)
HCT VFR BLD CALC: 45.8 % — SIGNIFICANT CHANGE UP (ref 39–50)
HGB BLD-MCNC: 15 G/DL — SIGNIFICANT CHANGE UP (ref 13–17)
IANC: 10.16 K/UL — HIGH (ref 1.8–7.4)
IMM GRANULOCYTES NFR BLD AUTO: 0.4 % — SIGNIFICANT CHANGE UP (ref 0–0.9)
LYMPHOCYTES # BLD AUTO: 18 % — SIGNIFICANT CHANGE UP (ref 13–44)
LYMPHOCYTES # BLD AUTO: 2.52 K/UL — SIGNIFICANT CHANGE UP (ref 1–3.3)
MAGNESIUM SERPL-MCNC: 2.4 MG/DL — SIGNIFICANT CHANGE UP (ref 1.6–2.6)
MCHC RBC-ENTMCNC: 29.5 PG — SIGNIFICANT CHANGE UP (ref 27–34)
MCHC RBC-ENTMCNC: 32.8 GM/DL — SIGNIFICANT CHANGE UP (ref 32–36)
MCV RBC AUTO: 90 FL — SIGNIFICANT CHANGE UP (ref 80–100)
MONOCYTES # BLD AUTO: 1.25 K/UL — HIGH (ref 0–0.9)
MONOCYTES NFR BLD AUTO: 8.9 % — SIGNIFICANT CHANGE UP (ref 2–14)
NEUTROPHILS # BLD AUTO: 10.16 K/UL — HIGH (ref 1.8–7.4)
NEUTROPHILS NFR BLD AUTO: 72.5 % — SIGNIFICANT CHANGE UP (ref 43–77)
NRBC # BLD: 0 /100 WBCS — SIGNIFICANT CHANGE UP (ref 0–0)
NRBC # FLD: 0 K/UL — SIGNIFICANT CHANGE UP (ref 0–0)
PHOSPHATE SERPL-MCNC: 4 MG/DL — SIGNIFICANT CHANGE UP (ref 2.5–4.5)
PLATELET # BLD AUTO: 234 K/UL — SIGNIFICANT CHANGE UP (ref 150–400)
POTASSIUM SERPL-MCNC: 4.7 MMOL/L — SIGNIFICANT CHANGE UP (ref 3.5–5.3)
POTASSIUM SERPL-SCNC: 4.7 MMOL/L — SIGNIFICANT CHANGE UP (ref 3.5–5.3)
RBC # BLD: 5.09 M/UL — SIGNIFICANT CHANGE UP (ref 4.2–5.8)
RBC # FLD: 13.3 % — SIGNIFICANT CHANGE UP (ref 10.3–14.5)
SODIUM SERPL-SCNC: 135 MMOL/L — SIGNIFICANT CHANGE UP (ref 135–145)
WBC # BLD: 14.01 K/UL — HIGH (ref 3.8–10.5)
WBC # FLD AUTO: 14.01 K/UL — HIGH (ref 3.8–10.5)

## 2023-08-18 RX ORDER — AMLODIPINE BESYLATE 2.5 MG/1
5 TABLET ORAL DAILY
Refills: 0 | Status: DISCONTINUED | OUTPATIENT
Start: 2023-08-18 | End: 2023-08-20

## 2023-08-18 RX ORDER — AMLODIPINE BESYLATE 2.5 MG/1
5 TABLET ORAL DAILY
Refills: 0 | Status: DISCONTINUED | OUTPATIENT
Start: 2023-08-18 | End: 2023-08-18

## 2023-08-18 RX ADMIN — ALBUTEROL 4 PUFF(S): 90 AEROSOL, METERED ORAL at 15:02

## 2023-08-18 RX ADMIN — AMLODIPINE BESYLATE 5 MILLIGRAM(S): 2.5 TABLET ORAL at 15:33

## 2023-08-18 RX ADMIN — Medication 40 MILLIGRAM(S): at 05:29

## 2023-08-18 RX ADMIN — ENOXAPARIN SODIUM 40 MILLIGRAM(S): 100 INJECTION SUBCUTANEOUS at 07:53

## 2023-08-18 RX ADMIN — ALBUTEROL 4 PUFF(S): 90 AEROSOL, METERED ORAL at 20:56

## 2023-08-18 RX ADMIN — ALBUTEROL 4 PUFF(S): 90 AEROSOL, METERED ORAL at 08:23

## 2023-08-18 RX ADMIN — BUDESONIDE AND FORMOTEROL FUMARATE DIHYDRATE 2 PUFF(S): 160; 4.5 AEROSOL RESPIRATORY (INHALATION) at 20:57

## 2023-08-18 RX ADMIN — BUDESONIDE AND FORMOTEROL FUMARATE DIHYDRATE 2 PUFF(S): 160; 4.5 AEROSOL RESPIRATORY (INHALATION) at 08:22

## 2023-08-18 RX ADMIN — CHLORHEXIDINE GLUCONATE 1 APPLICATION(S): 213 SOLUTION TOPICAL at 05:32

## 2023-08-18 NOTE — PROGRESS NOTE ADULT - SUBJECTIVE AND OBJECTIVE BOX
INTERVAL HPI/OVERNIGHT EVENTS:    SUBJECTIVE: Patient seen and examined at bedside. Today, he is feeling well without acute complaints. Blood pressure high this morning.    ROS: All negative except as listed above.    VITAL SIGNS:  ICU Vital Signs Last 24 Hrs  T(C): 36.6 (18 Aug 2023 08:00), Max: 37 (17 Aug 2023 16:00)  T(F): 97.8 (18 Aug 2023 08:00), Max: 98.6 (17 Aug 2023 16:00)  HR: 98 (18 Aug 2023 12:20) (81 - 117)  BP: 140/101 (18 Aug 2023 12:20) (132/89 - 197/107)  BP(mean): 110 (18 Aug 2023 12:20) (100 - 122)  ABP: --  ABP(mean): --  RR: 26 (18 Aug 2023 12:20) (19 - 30)  SpO2: 98% (18 Aug 2023 12:20) (93% - 100%)    O2 Parameters below as of 18 Aug 2023 12:20  Patient On (Oxygen Delivery Method): nasal cannula  O2 Flow (L/min): 2          Plateau pressure:   P/F ratio:     08-17 @ 07:01  -  08-18 @ 07:00  --------------------------------------------------------  IN: 770 mL / OUT: 1030 mL / NET: -260 mL    08-18 @ 07:01  -  08-18 @ 13:15  --------------------------------------------------------  IN: 120 mL / OUT: 150 mL / NET: -30 mL      CAPILLARY BLOOD GLUCOSE          ECG: reviewed.    PHYSICAL EXAM:    GENERAL: NAD, lying in bed comfortably  HEAD:  Atraumatic, normocephalic  EYES: EOMI, conjunctiva and sclera clear  NECK: Supple, trachea midline, no JVD  HEART: Regular rate and rhythm  LUNGS: Unlabored respirations  ABDOMEN: Soft, nontender.  EXTREMITIES: No LE edema  NERVOUS SYSTEM:  A&Ox3, following commands, moving all extremities, no focal deficits   SKIN: No rashes or lesions    MEDICATIONS:  MEDICATIONS  (STANDING):  amLODIPine   Tablet 5 milliGRAM(s) Oral daily  budesonide 160 MICROgram(s)/formoterol 4.5 MICROgram(s) Inhaler 2 Puff(s) Inhalation two times a day  chlorhexidine 2% Cloths 1 Application(s) Topical <User Schedule>  enoxaparin Injectable 40 milliGRAM(s) SubCutaneous every 24 hours  predniSONE   Tablet 40 milliGRAM(s) Oral daily    MEDICATIONS  (PRN):  albuterol    90 MICROgram(s) HFA Inhaler 4 Puff(s) Inhalation every 2 hours PRN Shortness of Breath and/or Wheezing      ALLERGIES:  Allergies    Beef (Unknown)  No Known Drug Allergies  eggs (Unknown)  pork (Unknown)    Intolerances        LABS:                        15.0   14.01 )-----------( 234      ( 18 Aug 2023 02:30 )             45.8     08-18    135  |  98  |  29<H>  ----------------------------<  103<H>  4.7   |  26  |  0.92    Ca    9.3      18 Aug   2023 02:30  Phos  4.0     08-18  Mg     2.40     08-18        Urinalysis Basic - ( 18 Aug 2023 02:30 )    Color: x / Appearance: x / SG: x / pH: x  Gluc: 103 mg/dL / Ketone: x  / Bili: x / Urobili: x   Blood: x / Protein: x / Nitrite: x   Leuk Esterase: x / RBC: x / WBC x   Sq Epi: x / Non Sq Epi: x / Bacteria: x      ABG:      vBG:    Micro:    Culture - Blood (collected 08-16-23 @ 01:15)  Source: .Blood Blood-Peripheral  Preliminary Report (08-18-23 @ 07:01):    No growth at 48 Hours    Culture - Blood (collected 08-16-23 @ 01:10)  Source: .Blood Blood-Peripheral  Preliminary Report (08-18-23 @ 07:01):    No growth at 48 Hours          RADIOLOGY & ADDITIONAL TESTS: Reviewed.

## 2023-08-18 NOTE — PROGRESS NOTE ADULT - ASSESSMENT
Pt is a 63 yo M with PMHx of asthma on qd Symbicort does not have an albuterol inh, current cannabis smoker presented to the ED 8/16 with 3 days of asthma exacerbation, found to be parainfluenza+, CXR showing clear lungs no consolidation. He left after receiving treatment feeling better, but re-presented to ED 10 minutes later for SOB again with decreased air movement in all lung fields, put on BIPAP tolerated with Precedex.    #Neuro  - No active issues  - Off Precedex gtt    #Skin  - No active issues    #GI  - Diet: Regular (preference: no beef, no pork)    #Metabolic  - No active issues    #Volume/  - No active issues  - 8/16: started D5 75 cc/h x10 hours, finished at 6PM    #Heme/onc  - Lovenox 40     #ID  - RVP+ for Parainfluenza 3, BCx NGTD  - Supportive care    #Cardio  - Patient started on amlodipine 5mg for HTN    #Pulm  - Severe asthma exacerbation likely 2/2 viral infection  - s/p epi, multiple Mg push, Duonebs, albuterol in ED  - s/p steroids pred 40, deca 10  - s/p Precedex for BIPAP toleration  - s/p Methylpred 40 bid (8/16), tapered to 40 methylpred qd 8/17, and to 40mg prednisone PO 8/18  - d/c'd continuous albuterol  - Proventil (albuterol) HFA 4 puffs q2 with prn  - Budesonide 160 mcg/formoterol 4.5 mcg inhaler  - Pulm f/u for workup of RAD +/- emphysema outpatient    #Ethics  Code status: full.   Pt requests no intubation if possible.  Lines: PIV

## 2023-08-19 LAB
ANION GAP SERPL CALC-SCNC: 9 MMOL/L — SIGNIFICANT CHANGE UP (ref 7–14)
BASOPHILS # BLD AUTO: 0.09 K/UL — SIGNIFICANT CHANGE UP (ref 0–0.2)
BASOPHILS NFR BLD AUTO: 0.8 % — SIGNIFICANT CHANGE UP (ref 0–2)
BUN SERPL-MCNC: 23 MG/DL — SIGNIFICANT CHANGE UP (ref 7–23)
CALCIUM SERPL-MCNC: 9.2 MG/DL — SIGNIFICANT CHANGE UP (ref 8.4–10.5)
CHLORIDE SERPL-SCNC: 97 MMOL/L — LOW (ref 98–107)
CO2 SERPL-SCNC: 29 MMOL/L — SIGNIFICANT CHANGE UP (ref 22–31)
CREAT SERPL-MCNC: 1.01 MG/DL — SIGNIFICANT CHANGE UP (ref 0.5–1.3)
EGFR: 84 ML/MIN/1.73M2 — SIGNIFICANT CHANGE UP
EOSINOPHIL # BLD AUTO: 0.1 K/UL — SIGNIFICANT CHANGE UP (ref 0–0.5)
EOSINOPHIL NFR BLD AUTO: 0.9 % — SIGNIFICANT CHANGE UP (ref 0–6)
GLUCOSE SERPL-MCNC: 100 MG/DL — HIGH (ref 70–99)
HCT VFR BLD CALC: 44.5 % — SIGNIFICANT CHANGE UP (ref 39–50)
HGB BLD-MCNC: 14.6 G/DL — SIGNIFICANT CHANGE UP (ref 13–17)
IANC: 6.13 K/UL — SIGNIFICANT CHANGE UP (ref 1.8–7.4)
LYMPHOCYTES # BLD AUTO: 3.25 K/UL — SIGNIFICANT CHANGE UP (ref 1–3.3)
LYMPHOCYTES # BLD AUTO: 30.5 % — SIGNIFICANT CHANGE UP (ref 13–44)
MAGNESIUM SERPL-MCNC: 2.1 MG/DL — SIGNIFICANT CHANGE UP (ref 1.6–2.6)
MANUAL SMEAR VERIFICATION: SIGNIFICANT CHANGE UP
MCHC RBC-ENTMCNC: 29.4 PG — SIGNIFICANT CHANGE UP (ref 27–34)
MCHC RBC-ENTMCNC: 32.8 GM/DL — SIGNIFICANT CHANGE UP (ref 32–36)
MCV RBC AUTO: 89.5 FL — SIGNIFICANT CHANGE UP (ref 80–100)
MONOCYTES # BLD AUTO: 1.35 K/UL — HIGH (ref 0–0.9)
MONOCYTES NFR BLD AUTO: 12.7 % — SIGNIFICANT CHANGE UP (ref 2–14)
NEUTROPHILS # BLD AUTO: 4.96 K/UL — SIGNIFICANT CHANGE UP (ref 1.8–7.4)
NEUTROPHILS NFR BLD AUTO: 46.6 % — SIGNIFICANT CHANGE UP (ref 43–77)
PHOSPHATE SERPL-MCNC: 3.2 MG/DL — SIGNIFICANT CHANGE UP (ref 2.5–4.5)
PLAT MORPH BLD: NORMAL — SIGNIFICANT CHANGE UP
PLATELET # BLD AUTO: 252 K/UL — SIGNIFICANT CHANGE UP (ref 150–400)
PLATELET COUNT - ESTIMATE: NORMAL — SIGNIFICANT CHANGE UP
POTASSIUM SERPL-MCNC: 4 MMOL/L — SIGNIFICANT CHANGE UP (ref 3.5–5.3)
POTASSIUM SERPL-SCNC: 4 MMOL/L — SIGNIFICANT CHANGE UP (ref 3.5–5.3)
RBC # BLD: 4.97 M/UL — SIGNIFICANT CHANGE UP (ref 4.2–5.8)
RBC # FLD: 12.9 % — SIGNIFICANT CHANGE UP (ref 10.3–14.5)
RBC BLD AUTO: NORMAL — SIGNIFICANT CHANGE UP
SODIUM SERPL-SCNC: 135 MMOL/L — SIGNIFICANT CHANGE UP (ref 135–145)
VARIANT LYMPHS # BLD: 8.5 % — HIGH (ref 0–6)
WBC # BLD: 10.65 K/UL — HIGH (ref 3.8–10.5)
WBC # FLD AUTO: 10.65 K/UL — HIGH (ref 3.8–10.5)

## 2023-08-19 PROCEDURE — 99233 SBSQ HOSP IP/OBS HIGH 50: CPT | Mod: GC

## 2023-08-19 RX ORDER — ALBUTEROL 90 UG/1
2 AEROSOL, METERED ORAL EVERY 6 HOURS
Refills: 0 | Status: DISCONTINUED | OUTPATIENT
Start: 2023-08-19 | End: 2023-08-20

## 2023-08-19 RX ADMIN — BUDESONIDE AND FORMOTEROL FUMARATE DIHYDRATE 2 PUFF(S): 160; 4.5 AEROSOL RESPIRATORY (INHALATION) at 21:16

## 2023-08-19 RX ADMIN — ALBUTEROL 2 PUFF(S): 90 AEROSOL, METERED ORAL at 16:34

## 2023-08-19 RX ADMIN — ALBUTEROL 2 PUFF(S): 90 AEROSOL, METERED ORAL at 10:32

## 2023-08-19 RX ADMIN — BUDESONIDE AND FORMOTEROL FUMARATE DIHYDRATE 2 PUFF(S): 160; 4.5 AEROSOL RESPIRATORY (INHALATION) at 10:32

## 2023-08-19 RX ADMIN — Medication 40 MILLIGRAM(S): at 06:37

## 2023-08-19 RX ADMIN — CHLORHEXIDINE GLUCONATE 1 APPLICATION(S): 213 SOLUTION TOPICAL at 06:37

## 2023-08-19 RX ADMIN — ALBUTEROL 2 PUFF(S): 90 AEROSOL, METERED ORAL at 21:15

## 2023-08-19 RX ADMIN — AMLODIPINE BESYLATE 5 MILLIGRAM(S): 2.5 TABLET ORAL at 06:37

## 2023-08-19 RX ADMIN — ENOXAPARIN SODIUM 40 MILLIGRAM(S): 100 INJECTION SUBCUTANEOUS at 09:19

## 2023-08-19 NOTE — PROGRESS NOTE ADULT - ASSESSMENT
Pt is a 63 yo M with PMHx of asthma on qd Symbicort does not have an albuterol inh, current cannabis smoker presented to the ED 8/16 with 3 days of asthma exacerbation, found to be parainfluenza+, CXR showing clear lungs no consolidation. He left after receiving treatment feeling better, but re-presented to ED 10 minutes later for SOB again with decreased air movement in all lung fields, put on BIPAP tolerated with Precedex.    #Neuro  - No active issues  - Off Precedex gtt    #Skin  - No active issues    #GI  - Diet: Regular (preference: no beef, no pork)    #Metabolic  - No active issues    #Volume/  - No active issues  - 8/16: started D5 75 cc/h x10 hours, finished at 6PM    #Heme/onc  - Lovenox 40     #ID  - RVP+ for Parainfluenza 3, BCx NGTD  - Supportive care    #Cardio  - Patient started on amlodipine 5mg for HTN    #Pulm  - Severe asthma exacerbation likely 2/2 viral infection  - s/p epi, multiple Mg push, Duonebs, albuterol in ED  - s/p steroids pred 40, deca 10  - s/p Precedex for BIPAP toleration  - s/p Methylpred 40 bid (8/16), tapered to 40 methylpred qd 8/17, and to 40mg prednisone PO 8/18  - d/c'd continuous albuterol  - Proventil (albuterol) HFA 4 puffs q2 with prn  - Budesonide 160 mcg/formoterol 4.5 mcg inhaler  - Pulm f/u for workup of RAD +/- emphysema outpatient    #Ethics  Code status: full.   Pt requests no intubation if possible.  Lines: PIV   Pt is a 61 yo M with PMHx of asthma on qd Symbicort does not have an albuterol inh, current cannabis smoker presented to the ED 8/16 with 3 days of asthma exacerbation, found to be parainfluenza+, CXR showing clear lungs no consolidation. He left after receiving treatment feeling better, but re-presented to ED 10 minutes later for SOB again with decreased air movement in all lung fields, put on BIPAP tolerated with Precedex.    #Neuro  - No active issues  - Off Precedex gtt    #Skin  - No active issues    #GI  - Diet: Regular (preference: no beef, no pork)    #Metabolic  - No active issues    #Volume/  - No active issues  - 8/16: started D5 75 cc/h x10 hours, finished at 6PM    #Heme/onc  - Lovenox 40     #ID  - RVP+ for Parainfluenza 3, BCx NGTD  - Supportive care    #Cardio  - Patient started on amlodipine 5mg for HTN    #Pulm  - Severe asthma exacerbation likely 2/2 viral infection  - s/p epi, multiple Mg push, Duonebs, albuterol in ED  - s/p steroids pred 40, deca 10  - s/p Precedex for BIPAP toleration  - s/p Methylpred 40 bid (8/16), tapered to 40 methylpred qd 8/17, and to 40mg prednisone PO 8/18  - C/w prednisone taper (40mg for 3 days, 30 mg for 3 days, 20 mg for 3 days, 10mg for 3 days)  - Continue 40mg prednisone until 8/20  - d/c'd continuous albuterol  - Proventil (albuterol) HFA 4 puffs q6 standing  - Ambulatory saturation test 8/19. Please follow-up.  - Budesonide 160 mcg/formoterol 4.5 mcg inhaler  - Pulm f/u for workup of RAD +/- emphysema outpatient    #Ethics  Code status: full.   Pt requests no intubation if possible.  Lines: PIV

## 2023-08-19 NOTE — PROGRESS NOTE ADULT - SUBJECTIVE AND OBJECTIVE BOX
INTERVAL HPI/OVERNIGHT EVENTS:    SUBJECTIVE: Patient seen and examined at bedside.     ROS: All negative except as listed above.    VITAL SIGNS:  ICU Vital Signs Last 24 Hrs  T(C): 36.9 (19 Aug 2023 08:00), Max: 37.2 (18 Aug 2023 20:00)  T(F): 98.5 (19 Aug 2023 08:00), Max: 98.9 (18 Aug 2023 20:00)  HR: 106 (19 Aug 2023 10:32) (83 - 106)  BP: 125/79 (19 Aug 2023 08:45) (119/82 - 159/93)  BP(mean): 91 (19 Aug 2023 08:45) (87 - 112)  ABP: --  ABP(mean): --  RR: 23 (19 Aug 2023 08:45) (20 - 27)  SpO2: 94% (19 Aug 2023 08:45) (94% - 100%)    O2 Parameters below as of 19 Aug 2023 08:45  Patient On (Oxygen Delivery Method): room air            Plateau pressure:   P/F ratio:     08-18 @ 07:01  -  08-19 @ 07:00  --------------------------------------------------------  IN: 320 mL / OUT: 525 mL / NET: -205 mL    08-19 @ 07:01  -  08-19 @ 12:00  --------------------------------------------------------  IN: 120 mL / OUT: 500 mL / NET: -380 mL      CAPILLARY BLOOD GLUCOSE          ECG: reviewed.    PHYSICAL EXAM:    GENERAL: NAD, lying in bed comfortably  HEAD:  Atraumatic, normocephalic  EYES: EOMI, PERRLA, conjunctiva and sclera clear  NECK: Supple, trachea midline, no JVD  HEART: Regular rate and rhythm, no murmurs, rubs, or gallops  LUNGS: Unlabored respirations.  Clear to auscultation bilaterally, no crackles, wheezing, or rhonchi  ABDOMEN: Soft, nontender, nondistended, +BS  EXTREMITIES: 2+ peripheral pulses bilaterally, cap refill<2 secs. No clubbing, cyanosis, or edema  NERVOUS SYSTEM:  A&Ox3, following commands, moving all extremities, no focal deficits   SKIN: No rashes or lesions    MEDICATIONS:  MEDICATIONS  (STANDING):  albuterol    90 MICROgram(s) HFA Inhaler 2 Puff(s) Inhalation every 6 hours  amLODIPine   Tablet 5 milliGRAM(s) Oral daily  budesonide 160 MICROgram(s)/formoterol 4.5 MICROgram(s) Inhaler 2 Puff(s) Inhalation two times a day  chlorhexidine 2% Cloths 1 Application(s) Topical <User Schedule>  enoxaparin Injectable 40 milliGRAM(s) SubCutaneous every 24 hours  predniSONE   Tablet 40 milliGRAM(s) Oral daily    MEDICATIONS  (PRN):      ALLERGIES:  Allergies    Beef (Unknown)  No Known Drug Allergies  eggs (Unknown)  pork (Unknown)    Intolerances        LABS:                        14.6   10.65 )-----------( 252      ( 19 Aug 2023 04:40 )             44.5     08-19    135  |  97<L>  |  23  ----------------------------<  100<H>  4.0   |  29  |  1.01    Ca    9.2      19 Aug 2023 04:40  Phos  3.2     08-19  Mg     2.10     08-19        Urinalysis Basic - ( 19 Aug 2023 04:40 )    Color: x / Appearance: x / SG: x / pH: x  Gluc: 100 mg/dL / Ketone: x  / Bili: x / Urobili: x   Blood: x / Protein: x / Nitrite: x   Leuk Esterase: x / RBC: x / WBC x   Sq Epi: x / Non Sq Epi: x / Bacteria: x      ABG:      vBG:    Micro:    Culture - Blood (collected 08-16-23 @ 01:15)  Source: .Blood Blood-Peripheral  Preliminary Report (08-19-23 @ 07:02):    No growth at 72 Hours    Culture - Blood (collected 08-16-23 @ 01:10)  Source: .Blood Blood-Peripheral  Preliminary Report (08-19-23 @ 07:02):    No growth at 72 Hours          RADIOLOGY & ADDITIONAL TESTS: Reviewed.   INTERVAL HPI/OVERNIGHT EVENTS:    SUBJECTIVE: Patient seen and examined at bedside. He is feeling well today, but would like to stay at least one more day before discharge.    ROS: All negative except as listed above.    VITAL SIGNS:  ICU Vital Signs Last 24 Hrs  T(C): 36.9 (19 Aug 2023 08:00), Max: 37.2 (18 Aug 2023 20:00)  T(F): 98.5 (19 Aug 2023 08:00), Max: 98.9 (18 Aug 2023 20:00)  HR: 106 (19 Aug 2023 10:32) (83 - 106)  BP: 125/79 (19 Aug 2023 08:45) (119/82 - 159/93)  BP(mean): 91 (19 Aug 2023 08:45) (87 - 112)  ABP: --  ABP(mean): --  RR: 23 (19 Aug 2023 08:45) (20 - 27)  SpO2: 94% (19 Aug 2023 08:45) (94% - 100%)    O2 Parameters below as of 19 Aug 2023 08:45  Patient On (Oxygen Delivery Method): room air            Plateau pressure:   P/F ratio:     08-18 @ 07:01  -  08-19 @ 07:00  --------------------------------------------------------  IN: 320 mL / OUT: 525 mL / NET: -205 mL    08-19 @ 07:01  -  08-19 @ 12:00  --------------------------------------------------------  IN: 120 mL / OUT: 500 mL / NET: -380 mL      CAPILLARY BLOOD GLUCOSE          ECG: reviewed.    PHYSICAL EXAM:    GENERAL: NAD, lying in bed comfortably  HEAD:  Atraumatic, normocephalic  EYES: EOMI, conjunctiva and sclera clear  NECK: Supple, trachea midline, no JVD  HEART: Regular rate and rhythm, no murmurs, rubs, or gallops  LUNGS: Unlabored respirations. Scattered wheezes bilaterally.  ABDOMEN: Soft, nontender, nondistended, +BS  EXTREMITIES: No LE edema  NERVOUS SYSTEM:  A&Ox3, following commands, moving all extremities, no focal deficits   SKIN: No rashes or lesions    MEDICATIONS:  MEDICATIONS  (STANDING):  albuterol    90 MICROgram(s) HFA Inhaler 2 Puff(s) Inhalation every 6 hours  amLODIPine   Tablet 5 milliGRAM(s) Oral daily  budesonide 160 MICROgram(s)/formoterol 4.5 MICROgram(s) Inhaler 2 Puff(s) Inhalation two times a day  chlorhexidine 2% Cloths 1 Application(s) Topical <User Schedule>  enoxaparin Injectable 40 milliGRAM(s) SubCutaneous every 24 hours  predniSONE   Tablet 40 milliGRAM(s) Oral daily    MEDICATIONS  (PRN):      ALLERGIES:  Allergies    Beef (Unknown)  No Known Drug Allergies  eggs (Unknown)  pork (Unknown)    Intolerances        LABS:                        14.6   10.65 )-----------( 252      ( 19 Aug 2023 04:40 )             44.5     08-19    135  |  97<L>  |  23  ----------------------------<  100<H>  4.0   |  29  |  1.01    Ca    9.2      19 Aug 2023 04:40  Phos  3.2     08-19  Mg     2.10     08-19        Urinalysis Basic - ( 19 Aug 2023 04:40 )    Color: x / Appearance: x / SG: x / pH: x  Gluc: 100 mg/dL / Ketone: x  / Bili: x / Urobili: x   Blood: x / Protein: x / Nitrite: x   Leuk Esterase: x / RBC: x / WBC x   Sq Epi: x / Non Sq Epi: x / Bacteria: x      ABG:      vBG:    Micro:    Culture - Blood (collected 08-16-23 @ 01:15)  Source: .Blood Blood-Peripheral  Preliminary Report (08-19-23 @ 07:02):    No growth at 72 Hours    Culture - Blood (collected 08-16-23 @ 01:10)  Source: .Blood Blood-Peripheral  Preliminary Report (08-19-23 @ 07:02):    No growth at 72 Hours          RADIOLOGY & ADDITIONAL TESTS: Reviewed.

## 2023-08-20 VITALS — OXYGEN SATURATION: 96 %

## 2023-08-20 LAB
ANION GAP SERPL CALC-SCNC: 12 MMOL/L — SIGNIFICANT CHANGE UP (ref 7–14)
BASOPHILS # BLD AUTO: 0.05 K/UL — SIGNIFICANT CHANGE UP (ref 0–0.2)
BASOPHILS NFR BLD AUTO: 0.4 % — SIGNIFICANT CHANGE UP (ref 0–2)
BLD GP AB SCN SERPL QL: NEGATIVE — SIGNIFICANT CHANGE UP
BUN SERPL-MCNC: 23 MG/DL — SIGNIFICANT CHANGE UP (ref 7–23)
CALCIUM SERPL-MCNC: 9 MG/DL — SIGNIFICANT CHANGE UP (ref 8.4–10.5)
CHLORIDE SERPL-SCNC: 100 MMOL/L — SIGNIFICANT CHANGE UP (ref 98–107)
CO2 SERPL-SCNC: 26 MMOL/L — SIGNIFICANT CHANGE UP (ref 22–31)
CREAT SERPL-MCNC: 0.95 MG/DL — SIGNIFICANT CHANGE UP (ref 0.5–1.3)
EGFR: 90 ML/MIN/1.73M2 — SIGNIFICANT CHANGE UP
EOSINOPHIL # BLD AUTO: 0.05 K/UL — SIGNIFICANT CHANGE UP (ref 0–0.5)
EOSINOPHIL NFR BLD AUTO: 0.4 % — SIGNIFICANT CHANGE UP (ref 0–6)
GLUCOSE SERPL-MCNC: 110 MG/DL — HIGH (ref 70–99)
HCT VFR BLD CALC: 45.7 % — SIGNIFICANT CHANGE UP (ref 39–50)
HGB BLD-MCNC: 15.1 G/DL — SIGNIFICANT CHANGE UP (ref 13–17)
IANC: 7.22 K/UL — SIGNIFICANT CHANGE UP (ref 1.8–7.4)
IMM GRANULOCYTES NFR BLD AUTO: 0.8 % — SIGNIFICANT CHANGE UP (ref 0–0.9)
LYMPHOCYTES # BLD AUTO: 29.1 % — SIGNIFICANT CHANGE UP (ref 13–44)
LYMPHOCYTES # BLD AUTO: 3.46 K/UL — HIGH (ref 1–3.3)
MAGNESIUM SERPL-MCNC: 2 MG/DL — SIGNIFICANT CHANGE UP (ref 1.6–2.6)
MCHC RBC-ENTMCNC: 29.7 PG — SIGNIFICANT CHANGE UP (ref 27–34)
MCHC RBC-ENTMCNC: 33 GM/DL — SIGNIFICANT CHANGE UP (ref 32–36)
MCV RBC AUTO: 90 FL — SIGNIFICANT CHANGE UP (ref 80–100)
MONOCYTES # BLD AUTO: 1.03 K/UL — HIGH (ref 0–0.9)
MONOCYTES NFR BLD AUTO: 8.7 % — SIGNIFICANT CHANGE UP (ref 2–14)
NEUTROPHILS # BLD AUTO: 7.22 K/UL — SIGNIFICANT CHANGE UP (ref 1.8–7.4)
NEUTROPHILS NFR BLD AUTO: 60.6 % — SIGNIFICANT CHANGE UP (ref 43–77)
NRBC # BLD: 0 /100 WBCS — SIGNIFICANT CHANGE UP (ref 0–0)
NRBC # FLD: 0 K/UL — SIGNIFICANT CHANGE UP (ref 0–0)
PHOSPHATE SERPL-MCNC: 3.3 MG/DL — SIGNIFICANT CHANGE UP (ref 2.5–4.5)
PLATELET # BLD AUTO: 235 K/UL — SIGNIFICANT CHANGE UP (ref 150–400)
POTASSIUM SERPL-MCNC: 4 MMOL/L — SIGNIFICANT CHANGE UP (ref 3.5–5.3)
POTASSIUM SERPL-SCNC: 4 MMOL/L — SIGNIFICANT CHANGE UP (ref 3.5–5.3)
RBC # BLD: 5.08 M/UL — SIGNIFICANT CHANGE UP (ref 4.2–5.8)
RBC # FLD: 12.7 % — SIGNIFICANT CHANGE UP (ref 10.3–14.5)
RH IG SCN BLD-IMP: POSITIVE — SIGNIFICANT CHANGE UP
SODIUM SERPL-SCNC: 138 MMOL/L — SIGNIFICANT CHANGE UP (ref 135–145)
WBC # BLD: 11.9 K/UL — HIGH (ref 3.8–10.5)
WBC # FLD AUTO: 11.9 K/UL — HIGH (ref 3.8–10.5)

## 2023-08-20 PROCEDURE — 99233 SBSQ HOSP IP/OBS HIGH 50: CPT | Mod: GC

## 2023-08-20 RX ORDER — BUDESONIDE AND FORMOTEROL FUMARATE DIHYDRATE 160; 4.5 UG/1; UG/1
2 AEROSOL RESPIRATORY (INHALATION)
Qty: 0 | Refills: 0 | DISCHARGE

## 2023-08-20 RX ORDER — ALBUTEROL 90 UG/1
2.5 AEROSOL, METERED ORAL
Qty: 14 | Refills: 0
Start: 2023-08-20

## 2023-08-20 RX ORDER — ALBUTEROL 90 UG/1
2 AEROSOL, METERED ORAL
Qty: 1 | Refills: 0
Start: 2023-08-20

## 2023-08-20 RX ORDER — BUDESONIDE AND FORMOTEROL FUMARATE DIHYDRATE 160; 4.5 UG/1; UG/1
2 AEROSOL RESPIRATORY (INHALATION)
Qty: 1 | Refills: 0
Start: 2023-08-20

## 2023-08-20 RX ORDER — ALBUTEROL 90 UG/1
2 AEROSOL, METERED ORAL
Qty: 0 | Refills: 0 | DISCHARGE

## 2023-08-20 RX ORDER — TIOTROPIUM BROMIDE 18 UG/1
2 CAPSULE ORAL; RESPIRATORY (INHALATION)
Qty: 1 | Refills: 0
Start: 2023-08-20

## 2023-08-20 RX ORDER — AMLODIPINE BESYLATE 2.5 MG/1
1 TABLET ORAL
Qty: 14 | Refills: 0
Start: 2023-08-20

## 2023-08-20 RX ADMIN — ALBUTEROL 2 PUFF(S): 90 AEROSOL, METERED ORAL at 03:39

## 2023-08-20 RX ADMIN — CHLORHEXIDINE GLUCONATE 1 APPLICATION(S): 213 SOLUTION TOPICAL at 05:45

## 2023-08-20 RX ADMIN — BUDESONIDE AND FORMOTEROL FUMARATE DIHYDRATE 2 PUFF(S): 160; 4.5 AEROSOL RESPIRATORY (INHALATION) at 09:49

## 2023-08-20 RX ADMIN — AMLODIPINE BESYLATE 5 MILLIGRAM(S): 2.5 TABLET ORAL at 05:43

## 2023-08-20 RX ADMIN — Medication 40 MILLIGRAM(S): at 05:43

## 2023-08-20 RX ADMIN — ALBUTEROL 2 PUFF(S): 90 AEROSOL, METERED ORAL at 09:48

## 2023-08-20 RX ADMIN — ALBUTEROL 2 PUFF(S): 90 AEROSOL, METERED ORAL at 15:40

## 2023-08-20 RX ADMIN — ENOXAPARIN SODIUM 40 MILLIGRAM(S): 100 INJECTION SUBCUTANEOUS at 08:48

## 2023-08-20 NOTE — DISCHARGE NOTE PROVIDER - HOSPITAL COURSE
Pt is a 63 yo M with PMHx of asthma, current cannabis smoker presented to the ED earlier this morning with 3 days of asthma exacerbation, c/o cough, chills, productive sputum, SOB consistent with previous asthma exacerbations. Pt has been taking his Symbicort as prescribed daily does not have a rescue albuterol inhaler. Denies fever, chest pain, pleuritic pain, nausea, vomiting, diarrhea, abd pain. Pt found to be parainfluenza+, CXR showing clear lungs, no focal consolidations. Pt received rounds of duonebs, given Mag and albuterol neb, prednisone 40mg, continued monitoring in CDU/observation in ED then left after feeling better. Pt then returned to the ED after walking 10min to the car and re-developing SOB.     ED course: Vitals /90, , RR 23, afebrile, satting 94% RA. Labs remarkable for K 3.4 (received albuterol in earlier ED visit), VBG: pH 7.42, lactate 3.3, pCO2 37. EKG showing no signs of ischemia. Pt placed on BIPAP, received duoneb, decadron 10, Mag 4g, albuterol neb, epi .3 IM felt better then acutely worsened.  Pt was then unable to tolerate bipap, given ketamine 20, started tolerating BIPAP.    Pt admitted to MICU for severe asthma exacerbation and new BIPAP requirement. Pt has never before been hospitalized for asthma, never intubated.    MICU course:  Pt was continued on BIPAP with continuous albuterol, started on precedex drip for toleration of BIPAP, but pt was transitioned from BIPAP to 4L NC day after admission. Pt was started on methylprednisolone which was switched to prednisone , was on prednisone 40mg PO qd from 8/18-8/20. Pt was started on Symbicort, which pt is on at home, and standing albuterol q6h was added.  Starting 8/19 pt was on room air, pt completed ambulatory sat on room air did not go below 94% SpO2. Pt's physical exam went from no wheezing and decreased breath sounds on admission to mild wheezing on day of discharge. Pt prescribed prednisone taper (40mg x 3 days, decreased by 10mg q3 days x 12 days), albuterol inhalation solution, Albuterol rescue inhaler, symbicort, spiriva. Pt is a 61 yo M with PMHx of asthma, current cannabis smoker presented to the ED earlier this morning with 3 days of asthma exacerbation, c/o cough, chills, productive sputum, SOB consistent with previous asthma exacerbations. Pt has been taking his Symbicort as prescribed daily does not have a rescue albuterol inhaler. Denies fever, chest pain, pleuritic pain, nausea, vomiting, diarrhea, abd pain. Pt found to be parainfluenza+, CXR showing clear lungs, no focal consolidations. Pt received rounds of duonebs, given Mag and albuterol neb, prednisone 40mg, continued monitoring in CDU/observation in ED then left after feeling better. Pt then returned to the ED after walking 10min to the car and re-developing SOB.     ED course: Vitals /90, , RR 23, afebrile, satting 94% RA. Labs remarkable for K 3.4 (received albuterol in earlier ED visit), VBG: pH 7.42, lactate 3.3, pCO2 37. EKG showing no signs of ischemia. Pt placed on BIPAP, received duoneb, decadron 10, Mag 4g, albuterol neb, epi .3 IM felt better then acutely worsened.  Pt was then unable to tolerate bipap, given ketamine 20, started tolerating BIPAP.    Pt admitted to MICU for severe asthma exacerbation in the setting of parainfluenza infection and new BIPAP requirement. Pt has never before been hospitalized for asthma, never intubated.    MICU course:  Pt was continued on BIPAP with continuous albuterol, started on precedex drip for toleration of BIPAP, but pt was transitioned from BIPAP to 4L NC day after admission. Pt was started on methylprednisolone which was switched to prednisone , was on prednisone 40mg PO qd from 8/18-8/20. Pt was started on Symbicort, which pt is on at home, and standing albuterol q6h was added.  Starting 8/19 pt was on room air, pt completed ambulatory sat on room air did not go below 94% SpO2. Pt's physical exam went from no wheezing and decreased breath sounds on admission to mild wheezing on day of discharge. Pt prescribed prednisone taper (40mg x 3 days, decreased by 10mg q3 days x 12 days), albuterol inhalation solution, Albuterol rescue inhaler, symbicort, spiriva.

## 2023-08-20 NOTE — PROGRESS NOTE ADULT - SUBJECTIVE AND OBJECTIVE BOX
INTERVAL HPI/OVERNIGHT EVENTS:    SUBJECTIVE: Patient seen and examined at bedside.     ROS: All negative except as listed above.    VITAL SIGNS:  ICU Vital Signs Last 24 Hrs  T(C): 36.7 (20 Aug 2023 12:00), Max: 37.1 (20 Aug 2023 00:00)  T(F): 98 (20 Aug 2023 12:00), Max: 98.7 (20 Aug 2023 00:00)  HR: 99 (20 Aug 2023 12:00) (79 - 101)  BP: 122/85 (20 Aug 2023 12:00) (112/71 - 138/83)  BP(mean): 94 (20 Aug 2023 12:00) (79 - 94)  ABP: --  ABP(mean): --  RR: 22 (20 Aug 2023 12:00) (18 - 31)  SpO2: 95% (20 Aug 2023 12:00) (92% - 96%)    O2 Parameters below as of 20 Aug 2023 12:00  Patient On (Oxygen Delivery Method): room air            Plateau pressure:   P/F ratio:     08-19 @ 07:01  -  08-20 @ 07:00  --------------------------------------------------------  IN: 370 mL / OUT: 675 mL / NET: -305 mL      CAPILLARY BLOOD GLUCOSE          ECG: reviewed.    PHYSICAL EXAM:    GENERAL: NAD, lying in bed comfortably  HEAD:  Atraumatic, normocephalic  EYES: EOMI, PERRLA, conjunctiva and sclera clear  NECK: Supple, trachea midline, no JVD  HEART: Regular rate and rhythm, no murmurs, rubs, or gallops  LUNGS: Unlabored respirations.  Clear to auscultation bilaterally, no crackles, wheezing, or rhonchi  ABDOMEN: Soft, nontender, nondistended, +BS  EXTREMITIES: 2+ peripheral pulses bilaterally, cap refill<2 secs. No clubbing, cyanosis, or edema  NERVOUS SYSTEM:  A&Ox3, following commands, moving all extremities, no focal deficits   SKIN: No rashes or lesions    MEDICATIONS:  MEDICATIONS  (STANDING):  albuterol    90 MICROgram(s) HFA Inhaler 2 Puff(s) Inhalation every 6 hours  amLODIPine   Tablet 5 milliGRAM(s) Oral daily  budesonide 160 MICROgram(s)/formoterol 4.5 MICROgram(s) Inhaler 2 Puff(s) Inhalation two times a day  chlorhexidine 2% Cloths 1 Application(s) Topical <User Schedule>  enoxaparin Injectable 40 milliGRAM(s) SubCutaneous every 24 hours    MEDICATIONS  (PRN):      ALLERGIES:  Allergies    Beef (Unknown)  No Known Drug Allergies  eggs (Unknown)  pork (Unknown)    Intolerances        LABS:                        15.1   11.90 )-----------( 235      ( 20 Aug 2023 00:50 )             45.7     08-20    138  |  100  |  23  ----------------------------<  110<H>  4.0   |  26  |  0.95    Ca    9.0      20 Aug 2023 00:50  Phos  3.3     08-20  Mg     2.00     08-20        Urinalysis Basic - ( 20 Aug 2023 00:50 )    Color: x / Appearance: x / SG: x / pH: x  Gluc: 110 mg/dL / Ketone: x  / Bili: x / Urobili: x   Blood: x / Protein: x / Nitrite: x   Leuk Esterase: x / RBC: x / WBC x   Sq Epi: x / Non Sq Epi: x / Bacteria: x      ABG:      vBG:    Micro:    Culture - Blood (collected 08-16-23 @ 01:15)  Source: .Blood Blood-Peripheral  Preliminary Report (08-20-23 @ 07:01):    No growth at 4 days    Culture - Blood (collected 08-16-23 @ 01:10)  Source: .Blood Blood-Peripheral  Preliminary Report (08-20-23 @ 07:01):    No growth at 4 days          RADIOLOGY & ADDITIONAL TESTS: Reviewed.   INTERVAL HPI/OVERNIGHT EVENTS: no acute events overnight, has been on room air.     SUBJECTIVE: Patient seen and examined at bedside. Pt denies any pain, SOB, has no complaints.    ROS: All negative except as listed above.    VITAL SIGNS:  ICU Vital Signs Last 24 Hrs  T(C): 36.7 (20 Aug 2023 12:00), Max: 37.1 (20 Aug 2023 00:00)  T(F): 98 (20 Aug 2023 12:00), Max: 98.7 (20 Aug 2023 00:00)  HR: 99 (20 Aug 2023 12:00) (79 - 101)  BP: 122/85 (20 Aug 2023 12:00) (112/71 - 138/83)  BP(mean): 94 (20 Aug 2023 12:00) (79 - 94)  ABP: --  ABP(mean): --  RR: 22 (20 Aug 2023 12:00) (18 - 31)  SpO2: 95% (20 Aug 2023 12:00) (92% - 96%)    O2 Parameters below as of 20 Aug 2023 12:00  Patient On (Oxygen Delivery Method): room air            Plateau pressure:   P/F ratio:     08-19 @ 07:01  -  08-20 @ 07:00  --------------------------------------------------------  IN: 370 mL / OUT: 675 mL / NET: -305 mL      CAPILLARY BLOOD GLUCOSE          ECG: reviewed.    PHYSICAL EXAM:    GENERAL: NAD, sitting in chair comfortable  HEAD:  Atraumatic, normocephalic  NECK: Supple, trachea midline, no JVD  HEART: Regular rate and rhythm, no murmurs, rubs, or gallops  LUNGS: on room air, satting mid 90s, mild wheezing in lungs b/l (improved from no wheezing + decreased breath sounds b/l as before)  ABDOMEN: Soft, nontender, nondistended, +BS  EXTREMITIES: 2+ peripheral pulses bilaterally, cap refill<2 secs. No clubbing, cyanosis, or edema  NERVOUS SYSTEM:  A&Ox3, following commands, moving all extremities, no focal deficits     MEDICATIONS:  MEDICATIONS  (STANDING):  albuterol    90 MICROgram(s) HFA Inhaler 2 Puff(s) Inhalation every 6 hours  amLODIPine   Tablet 5 milliGRAM(s) Oral daily  budesonide 160 MICROgram(s)/formoterol 4.5 MICROgram(s) Inhaler 2 Puff(s) Inhalation two times a day  chlorhexidine 2% Cloths 1 Application(s) Topical <User Schedule>  enoxaparin Injectable 40 milliGRAM(s) SubCutaneous every 24 hours    MEDICATIONS  (PRN):      ALLERGIES:  Allergies    Beef (Unknown)  No Known Drug Allergies  eggs (Unknown)  pork (Unknown)    Intolerances        LABS:                        15.1   11.90 )-----------( 235      ( 20 Aug 2023 00:50 )             45.7     08-20    138  |  100  |  23  ----------------------------<  110<H>  4.0   |  26  |  0.95    Ca    9.0      20 Aug 2023 00:50  Phos  3.3     08-20  Mg     2.00     08-20        Urinalysis Basic - ( 20 Aug 2023 00:50 )    Color: x / Appearance: x / SG: x / pH: x  Gluc: 110 mg/dL / Ketone: x  / Bili: x / Urobili: x   Blood: x / Protein: x / Nitrite: x   Leuk Esterase: x / RBC: x / WBC x   Sq Epi: x / Non Sq Epi: x / Bacteria: x      ABG:      vBG:    Micro:    Culture - Blood (collected 08-16-23 @ 01:15)  Source: .Blood Blood-Peripheral  Preliminary Report (08-20-23 @ 07:01):    No growth at 4 days    Culture - Blood (collected 08-16-23 @ 01:10)  Source: .Blood Blood-Peripheral  Preliminary Report (08-20-23 @ 07:01):    No growth at 4 days          RADIOLOGY & ADDITIONAL TESTS: Reviewed.

## 2023-08-20 NOTE — DISCHARGE NOTE NURSING/CASE MANAGEMENT/SOCIAL WORK - NSDCPEFALRISK_GEN_ALL_CORE
For information on Fall & Injury Prevention, visit: [email or web address] OR  [email or web address] OR  https://www.cdc.gov/steadi/patient.html

## 2023-08-20 NOTE — DISCHARGE NOTE NURSING/CASE MANAGEMENT/SOCIAL WORK - PATIENT PORTAL LINK FT
You can access the FollowMyHealth Patient Portal offered by [facility] [place] by registering at the following website: [email or web address]. By joining [employer]’s FollowMyHealth portal, you will also be able to view your health information using other applications (apps) compatible with our system.

## 2023-08-20 NOTE — DISCHARGE NOTE PROVIDER - CARE PROVIDER_API CALL
Saad Sunshine  Pulmonary Disease  N  SARAH JOSEPH, Phys,    Phone: [phone]  Fax: [phone]  Follow Up Time: 1 week

## 2023-08-20 NOTE — DISCHARGE NOTE PROVIDER - NSDCCPCAREPLAN_GEN_ALL_CORE_FT
PRINCIPAL DISCHARGE DIAGNOSIS  Diagnosis: Acute asthma exacerbation  Assessment and Plan of Treatment: You were admitted to the hospital for a severe asthma exacerbation. You were placed on a BIPAP machine temporarily to help you breath and help open up your lungs. You were continued on albuterol inhalation solutions, inhaler, and Symbicort inhaler for your asthma.  Please follow-up with the [facility] pulmonology clinic and your primary care physician after this hospital visit.  Asthma is a long-term (chronic) condition that causes recurrent swelling and narrowing of the airways. The airways are the passages that lead from the nose and mouth down into the lungs. When asthma symptoms get worse, it is called an asthma flare. When this happens, it can be difficult to breathe. Asthma flares can range from minor to life-threatening.  Asthma cannot be cured, but medicines and lifestyle changes can help to control your asthma symptoms  Please seek medical attention if you experience the following: increasing shortness of breath not controlled with your asthma medications, severe wheezing, loss of consciousness, severe confusion chest pain.

## 2023-08-20 NOTE — PROGRESS NOTE ADULT - REASON FOR ADMISSION
Asthma exacerbation, new BIPAP

## 2023-08-20 NOTE — DISCHARGE NOTE PROVIDER - NSDCHOSPICE_GEN_A_CORE
Problem: Patient Care Overview  Goal: Plan of Care Review  Recent Flowsheet Documentation  Taken 9/14/2020 1448 by Nadia Conklin, OT  Progress: improving  Plan of Care Reviewed With: patient  Outcome Summary: Pt required min A supine to sit ,  CGA to don/doff socks,  mod A STS first time and min A 2nd time with RW,  min A to take 4 sidesteps toward HOB and to take 2 steps forward and 2 back with RW.  Pt with good effort and tolerance to complete 15 reps BUE TE.  Pt is progressing, but limited by weakness and fear of falling.      No

## 2023-08-20 NOTE — PROGRESS NOTE ADULT - ASSESSMENT
Pt is a 63 yo M with PMHx of asthma on qd Symbicort does not have an albuterol inh, current cannabis smoker presented to the ED 8/16 with 3 days of asthma exacerbation, found to be parainfluenza+, CXR showing clear lungs no consolidation. He left after receiving treatment feeling better, but re-presented to ED 10 minutes later for SOB again with decreased air movement in all lung fields, put on BIPAP tolerated with Precedex.    #Neuro  - No active issues  - Off Precedex gtt (was on for toleration of BIPAP)    #Cardio  - Patient started on amlodipine 5mg for HTN    #Pulm  - Severe asthma exacerbation likely 2/2 viral infection  - s/p epi, multiple Mg push, Duonebs, albuterol in ED  - s/p steroids pred 40, deca 10  - s/p Precedex for BIPAP toleration  - s/p Methylpred 40 bid (8/16), tapered to 40 methylpred qd 8/17, and to 40mg prednisone PO 8/18  - C/w prednisone taper (40mg for 3 days, 30 mg for 3 days, 20 mg for 3 days, 10mg for 3 days)  - Continue 40mg prednisone until 8/20  - d/c'd continuous albuterol  - Ambulatory saturation test 8/19: did not go < 94% on RA ambulating  - c/w Symbicort 160 mcg/4.5 mcg bid, Albuterol 90mcg HFA inhaler q6h  - Emailed [email or web address], will give pt clinic info on d/c today as well    #GI  - Diet: Regular (preference: no beef, no pork)    #Volume/  - No active issues  - 8/16: started D5 75 cc/h x10 hours, finished at 6PM    #Heme/onc  - no DVT ppx, patient is actively mobile    #ID  - RVP 8/15: + for Parainfluenza 3  - BCx 8/16: ngtd  - Supportive care    #Ethics  Code status: full.   Pt requests no intubation if possible.  Lines: PIV

## 2023-08-20 NOTE — DISCHARGE NOTE PROVIDER - NSFOLLOWUPCLINICS_GEN_ALL_ED_FT
[facility] Pulmonolgy and Sleep Medicine  Pulmonology  [address] [place] [address], [place] [address]  Phone: [phone]  Fax:

## 2023-08-20 NOTE — PROGRESS NOTE ADULT - ATTENDING COMMENTS
62M admitted to MICU for acute hypoxemic respiratory failure in setting of asthma exacerbation due to parainfluenza infection.  Clinically improved, currently on room air.  Awaiting transfer to medical floors.      .
62M admitted to MICU for acute hypoxemic respiratory failure in setting of asthma exacerbation due to parainfluenza infection.  Clinically improved, currently on room air.  Awaiting transfer to medical floors.
62M admitted to MICU for acute hypoxemic respiratory failure in setting of asthma exacerbation due to parainfluenza infection.  Clinically improved, currently on room air.  Less wheezing.  Plan for discharge home today with steroid taper.  Arrange for pulmonary follow up as outpatient.
Patient examined and case reviewed in detail on bedside rounds  Pt safe for transfer out of MICU
Patient examined and case reviewed in detail on bedside rounds  Critically ill and unstable on NIV with severe asthma  Frequent bedside visits with therapy change today.   I have personally provided 35+ minutes of critical care time concurrently with the resident/fellow; this excludes time spent on separate procedures.

## 2023-08-21 LAB
CULTURE RESULTS: SIGNIFICANT CHANGE UP
CULTURE RESULTS: SIGNIFICANT CHANGE UP
SPECIMEN SOURCE: SIGNIFICANT CHANGE UP
SPECIMEN SOURCE: SIGNIFICANT CHANGE UP

## 2024-05-22 NOTE — PROGRESS NOTE ADULT - SUBJECTIVE AND OBJECTIVE BOX
Onesimo Vasquez MD; PGY1  Medicine Team 2  Pager: [phone]  After 7PM on weekdays or 12PM on weekends, please page [phone]    Patient is a 57y old  Male who presents with a chief complaint of SOB (06 Jan 2018 14:50)        SUBJECTIVE / OVERNIGHT EVENTS: NAEON. Patient's respiratory function continues to improve and is breathing comfortably on RA. Cough w/ sparse clear sputum also improving.      MEDICATIONS  (STANDING):  buDESOnide  80 MICROgram(s)/formoterol 4.5 MICROgram(s) Inhaler 2 Puff(s) Inhalation two times a day  heparin  Injectable 5000 Unit(s) SubCutaneous every 8 hours  oseltamivir 75 milliGRAM(s) Oral two times a day  predniSONE   Tablet 40 milliGRAM(s) Oral daily  sodium chloride 0.9%. 1000 milliLiter(s) (150 mL/Hr) IV Continuous <Continuous>    MEDICATIONS  (PRN):  ALBUTerol    90 MICROgram(s) HFA Inhaler 2 Puff(s) Inhalation every 6 hours PRN Wheezing  guaiFENesin    Syrup 100 milliGRAM(s) Oral every 6 hours PRN Cough      T(C): 36.8 (01-08-18 @ 05:26), Max: 36.9 (01-07-18 @ 21:03)  T(F): 98.2 (01-08-18 @ 05:26), Max: 98.4 (01-07-18 @ 21:03)  HR: 80 (01-08-18 @ 05:26) (80 - 92)  BP: 153/90 (01-08-18 @ 05:26) (135/90 - 153/90)  ABP: --  ABP(mean): --  RR: 18 (01-08-18 @ 05:26) (16 - 20)  SpO2: 100% (01-08-18 @ 05:26) (99% - 100%)    CAPILLARY BLOOD GLUCOSE        I&O's Summary      PHYSICAL EXAM:  GENERAL: NAD, well-developed  HEAD:  Atraumatic, Normocephalic  EYES: EOMI, PERRLA, conjunctiva and sclera clear  ENMT: No tonsillar erythema, exudates, or enlargement; Moist mucous membranes  NECK: Supple, No JVD  NERVOUS SYSTEM:  Alert & Oriented X3, Good concentration; Motor Strength 5/5 B/L upper and lower extremities  CHEST/LUNG: Mild wheezing  HEART: Tachycardic; No murmurs, rubs, or gallops  ABDOMEN: Soft, Nontender, Nondistended; Bowel sounds present  EXTREMITIES:  2+ Peripheral Pulses, No clubbing, cyanosis, or edema  LYMPH: No lymphadenopathy noted  SKIN: No rashes or lesions    LABS:    01-08    142  |  105  |  15  ----------------------------<  91  4.0   |  25  |  1.04    Ca    8.3<L>      08 Jan 2018 06:15  Phos  3.2     01-08  Mg     1.8     01-08        CARDIAC MARKERS ( 08 Jan 2018 06:15 )  x     / x     / 915 u/L / x     / x      CARDIAC MARKERS ( 07 Jan 2018 05:41 )  x     / x     / 1711 u/L / x     / x              RADIOLOGY & ADDITIONAL TESTS:    Imaging Personally Reviewed:  Consultant(s) Notes Reviewed:    Care Discussed with Consultants/Other Providers: Stable
